# Patient Record
Sex: FEMALE | Race: WHITE | Employment: OTHER | URBAN - METROPOLITAN AREA
[De-identification: names, ages, dates, MRNs, and addresses within clinical notes are randomized per-mention and may not be internally consistent; named-entity substitution may affect disease eponyms.]

---

## 2017-01-01 ENCOUNTER — ANESTHESIA EVENT (OUTPATIENT)
Dept: SURGERY | Facility: HOSPITAL | Age: 37
DRG: 432 | End: 2017-01-01
Payer: MEDICAID

## 2017-01-01 ENCOUNTER — ANESTHESIA EVENT (OUTPATIENT)
Dept: ENDOSCOPY | Facility: HOSPITAL | Age: 37
DRG: 432 | End: 2017-01-01
Payer: MEDICAID

## 2017-01-01 ENCOUNTER — HOSPITAL ENCOUNTER (EMERGENCY)
Facility: HOSPITAL | Age: 37
Discharge: HOME | End: 2017-01-01
Attending: EMERGENCY MEDICINE
Payer: MEDICAID

## 2017-01-01 ENCOUNTER — APPOINTMENT (OUTPATIENT)
Dept: ULTRASOUND IMAGING | Facility: HOSPITAL | Age: 37
DRG: 432 | End: 2017-01-01
Attending: INTERNAL MEDICINE
Payer: MEDICAID

## 2017-01-01 ENCOUNTER — ANESTHESIA (OUTPATIENT)
Dept: ENDOSCOPY | Facility: HOSPITAL | Age: 37
End: 2017-01-01

## 2017-01-01 ENCOUNTER — APPOINTMENT (OUTPATIENT)
Dept: ULTRASOUND IMAGING | Facility: HOSPITAL | Age: 37
DRG: 178 | End: 2017-01-01
Attending: EMERGENCY MEDICINE
Payer: MEDICAID

## 2017-01-01 ENCOUNTER — ANESTHESIA (OUTPATIENT)
Dept: ENDOSCOPY | Facility: HOSPITAL | Age: 37
DRG: 441 | End: 2017-01-01
Payer: MEDICAID

## 2017-01-01 ENCOUNTER — SURGERY (OUTPATIENT)
Age: 37
End: 2017-01-01

## 2017-01-01 ENCOUNTER — ANESTHESIA EVENT (OUTPATIENT)
Dept: ENDOSCOPY | Facility: HOSPITAL | Age: 37
End: 2017-01-01
Payer: MEDICAID

## 2017-01-01 ENCOUNTER — HOSPITAL ENCOUNTER (OUTPATIENT)
Facility: HOSPITAL | Age: 37
Setting detail: HOSPITAL OUTPATIENT SURGERY
Discharge: HOME | End: 2017-01-01
Attending: INTERNAL MEDICINE | Admitting: INTERNAL MEDICINE
Payer: MEDICAID

## 2017-01-01 ENCOUNTER — ANESTHESIA (OUTPATIENT)
Dept: SURGERY | Facility: HOSPITAL | Age: 37
DRG: 432 | End: 2017-01-01
Payer: MEDICAID

## 2017-01-01 ENCOUNTER — HOSPITAL ENCOUNTER (INPATIENT)
Facility: HOSPITAL | Age: 37
LOS: 2 days | Discharge: HOME | DRG: 178 | End: 2017-01-01
Attending: EMERGENCY MEDICINE | Admitting: INTERNAL MEDICINE
Payer: MEDICAID

## 2017-01-01 ENCOUNTER — ANESTHESIA EVENT (OUTPATIENT)
Dept: ENDOSCOPY | Facility: HOSPITAL | Age: 37
End: 2017-01-01

## 2017-01-01 ENCOUNTER — APPOINTMENT (OUTPATIENT)
Dept: ULTRASOUND IMAGING | Facility: HOSPITAL | Age: 37
DRG: 432 | End: 2017-01-01
Attending: EMERGENCY MEDICINE
Payer: MEDICAID

## 2017-01-01 ENCOUNTER — ANESTHESIA EVENT (OUTPATIENT)
Dept: ENDOSCOPY | Facility: HOSPITAL | Age: 37
DRG: 441 | End: 2017-01-01
Payer: MEDICAID

## 2017-01-01 ENCOUNTER — APPOINTMENT (OUTPATIENT)
Dept: GENERAL RADIOLOGY | Facility: HOSPITAL | Age: 37
DRG: 432 | End: 2017-01-01
Attending: EMERGENCY MEDICINE
Payer: MEDICAID

## 2017-01-01 ENCOUNTER — ANESTHESIA (OUTPATIENT)
Dept: ENDOSCOPY | Facility: HOSPITAL | Age: 37
End: 2017-01-01
Payer: MEDICAID

## 2017-01-01 ENCOUNTER — HOSPITAL ENCOUNTER (INPATIENT)
Facility: HOSPITAL | Age: 37
LOS: 2 days | Discharge: HOME | DRG: 432 | End: 2017-01-01
Attending: EMERGENCY MEDICINE | Admitting: HOSPITALIST
Payer: MEDICAID

## 2017-01-01 ENCOUNTER — APPOINTMENT (OUTPATIENT)
Dept: GENERAL RADIOLOGY | Facility: HOSPITAL | Age: 37
DRG: 178 | End: 2017-01-01
Attending: EMERGENCY MEDICINE
Payer: MEDICAID

## 2017-01-01 ENCOUNTER — APPOINTMENT (OUTPATIENT)
Dept: GENERAL RADIOLOGY | Facility: HOSPITAL | Age: 37
DRG: 432 | End: 2017-01-01
Attending: NURSE PRACTITIONER
Payer: MEDICAID

## 2017-01-01 ENCOUNTER — HOSPITAL ENCOUNTER (INPATIENT)
Facility: HOSPITAL | Age: 37
LOS: 2 days | Discharge: HOME | DRG: 441 | End: 2017-01-01
Attending: EMERGENCY MEDICINE | Admitting: HOSPITALIST
Payer: MEDICAID

## 2017-01-01 ENCOUNTER — LAB ENCOUNTER (OUTPATIENT)
Dept: LAB | Facility: HOSPITAL | Age: 37
End: 2017-01-01
Attending: INTERNAL MEDICINE
Payer: MEDICAID

## 2017-01-01 ENCOUNTER — APPOINTMENT (OUTPATIENT)
Dept: LAB | Facility: HOSPITAL | Age: 37
End: 2017-01-01
Payer: MEDICAID

## 2017-01-01 ENCOUNTER — ANESTHESIA (OUTPATIENT)
Dept: ENDOSCOPY | Facility: HOSPITAL | Age: 37
DRG: 432 | End: 2017-01-01
Payer: MEDICAID

## 2017-01-01 ENCOUNTER — HOSPITAL ENCOUNTER (INPATIENT)
Facility: HOSPITAL | Age: 37
LOS: 1 days | Discharge: ANOTHER HOSPITAL | DRG: 432 | End: 2017-01-01
Attending: EMERGENCY MEDICINE | Admitting: INTERNAL MEDICINE
Payer: MEDICAID

## 2017-01-01 ENCOUNTER — APPOINTMENT (OUTPATIENT)
Dept: CT IMAGING | Facility: HOSPITAL | Age: 37
DRG: 432 | End: 2017-01-01
Attending: INTERNAL MEDICINE
Payer: MEDICAID

## 2017-01-01 ENCOUNTER — APPOINTMENT (OUTPATIENT)
Dept: CV DIAGNOSTICS | Facility: HOSPITAL | Age: 37
DRG: 432 | End: 2017-01-01
Attending: INTERNAL MEDICINE
Payer: MEDICAID

## 2017-01-01 VITALS
OXYGEN SATURATION: 100 % | RESPIRATION RATE: 12 BRPM | BODY MASS INDEX: 27.12 KG/M2 | HEART RATE: 75 BPM | SYSTOLIC BLOOD PRESSURE: 103 MMHG | DIASTOLIC BLOOD PRESSURE: 64 MMHG | TEMPERATURE: 98 F | HEIGHT: 67 IN | WEIGHT: 172.81 LBS

## 2017-01-01 VITALS
RESPIRATION RATE: 17 BRPM | SYSTOLIC BLOOD PRESSURE: 122 MMHG | HEIGHT: 67 IN | HEART RATE: 70 BPM | DIASTOLIC BLOOD PRESSURE: 68 MMHG | BODY MASS INDEX: 28.09 KG/M2 | TEMPERATURE: 98 F | OXYGEN SATURATION: 98 % | WEIGHT: 179 LBS

## 2017-01-01 VITALS
BODY MASS INDEX: 28.25 KG/M2 | TEMPERATURE: 99 F | HEART RATE: 84 BPM | OXYGEN SATURATION: 98 % | RESPIRATION RATE: 16 BRPM | HEIGHT: 67 IN | WEIGHT: 180 LBS | DIASTOLIC BLOOD PRESSURE: 84 MMHG | SYSTOLIC BLOOD PRESSURE: 134 MMHG

## 2017-01-01 VITALS
WEIGHT: 185.63 LBS | TEMPERATURE: 99 F | OXYGEN SATURATION: 94 % | SYSTOLIC BLOOD PRESSURE: 118 MMHG | DIASTOLIC BLOOD PRESSURE: 83 MMHG | BODY MASS INDEX: 29.13 KG/M2 | HEART RATE: 75 BPM | RESPIRATION RATE: 20 BRPM | HEIGHT: 67 IN

## 2017-01-01 VITALS
HEART RATE: 84 BPM | SYSTOLIC BLOOD PRESSURE: 102 MMHG | WEIGHT: 178 LBS | OXYGEN SATURATION: 98 % | BODY MASS INDEX: 27.94 KG/M2 | RESPIRATION RATE: 14 BRPM | TEMPERATURE: 98 F | DIASTOLIC BLOOD PRESSURE: 44 MMHG | HEIGHT: 67 IN

## 2017-01-01 VITALS
RESPIRATION RATE: 15 BRPM | HEIGHT: 67 IN | TEMPERATURE: 99 F | WEIGHT: 191.56 LBS | BODY MASS INDEX: 30.07 KG/M2 | HEART RATE: 84 BPM | DIASTOLIC BLOOD PRESSURE: 61 MMHG | SYSTOLIC BLOOD PRESSURE: 111 MMHG | OXYGEN SATURATION: 96 %

## 2017-01-01 VITALS
DIASTOLIC BLOOD PRESSURE: 77 MMHG | HEIGHT: 67 IN | SYSTOLIC BLOOD PRESSURE: 104 MMHG | TEMPERATURE: 98 F | HEART RATE: 84 BPM | RESPIRATION RATE: 16 BRPM | OXYGEN SATURATION: 98 % | WEIGHT: 180 LBS | BODY MASS INDEX: 28.25 KG/M2

## 2017-01-01 VITALS
OXYGEN SATURATION: 97 % | BODY MASS INDEX: 30.1 KG/M2 | DIASTOLIC BLOOD PRESSURE: 69 MMHG | HEART RATE: 85 BPM | SYSTOLIC BLOOD PRESSURE: 104 MMHG | RESPIRATION RATE: 18 BRPM | WEIGHT: 191.81 LBS | TEMPERATURE: 98 F | HEIGHT: 67 IN

## 2017-01-01 VITALS
RESPIRATION RATE: 16 BRPM | BODY MASS INDEX: 27.94 KG/M2 | HEART RATE: 71 BPM | OXYGEN SATURATION: 96 % | WEIGHT: 178 LBS | HEIGHT: 67 IN | DIASTOLIC BLOOD PRESSURE: 79 MMHG | TEMPERATURE: 99 F | SYSTOLIC BLOOD PRESSURE: 98 MMHG

## 2017-01-01 DIAGNOSIS — K92.2 GASTROINTESTINAL HEMORRHAGE, UNSPECIFIED GASTROINTESTINAL HEMORRHAGE TYPE: Primary | ICD-10-CM

## 2017-01-01 DIAGNOSIS — K70.31 ALCOHOLIC CIRRHOSIS OF LIVER WITH ASCITES (HCC): ICD-10-CM

## 2017-01-01 DIAGNOSIS — R06.00 DYSPNEA, UNSPECIFIED TYPE: Primary | ICD-10-CM

## 2017-01-01 DIAGNOSIS — K70.30 ALCOHOLIC CIRRHOSIS OF LIVER WITHOUT ASCITES (HCC): ICD-10-CM

## 2017-01-01 DIAGNOSIS — R17 JAUNDICE: ICD-10-CM

## 2017-01-01 DIAGNOSIS — I85.01 BLEEDING ESOPHAGEAL VARICES, UNSPECIFIED ESOPHAGEAL VARICES TYPE (HCC): Primary | ICD-10-CM

## 2017-01-01 DIAGNOSIS — Z01.818 PREOP TESTING: Primary | ICD-10-CM

## 2017-01-01 DIAGNOSIS — K72.00 ACUTE LIVER FAILURE WITHOUT HEPATIC COMA: Primary | ICD-10-CM

## 2017-01-01 DIAGNOSIS — K92.2 GASTROINTESTINAL HEMORRHAGE, UNSPECIFIED GASTROINTESTINAL HEMORRHAGE TYPE: ICD-10-CM

## 2017-01-01 DIAGNOSIS — K70.31 ASCITES DUE TO ALCOHOLIC CIRRHOSIS (HCC): ICD-10-CM
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
## 2017-01-01 DIAGNOSIS — D64.9 ANEMIA, UNSPECIFIED TYPE: ICD-10-CM

## 2017-01-01 DIAGNOSIS — K72.00 ACUTE LIVER FAILURE WITHOUT HEPATIC COMA: ICD-10-CM

## 2017-01-01 DIAGNOSIS — L03.115 CELLULITIS OF RIGHT LOWER EXTREMITY: Primary | ICD-10-CM

## 2017-01-01 DIAGNOSIS — Z01.818 PREOP TESTING: ICD-10-CM

## 2017-01-01 DIAGNOSIS — J18.9 NOSOCOMIAL PNEUMONIA: ICD-10-CM

## 2017-01-01 LAB
ALBUMIN SERPL-MCNC: 1.4 G/DL (ref 3.5–4.8)
ALBUMIN SERPL-MCNC: 1.5 G/DL (ref 3.5–4.8)
ALBUMIN SERPL-MCNC: 1.8 G/DL (ref 3.5–4.8)
ALLENS TEST: POSITIVE
ALP LIVER SERPL-CCNC: 122 U/L (ref 37–98)
ALP LIVER SERPL-CCNC: 135 U/L (ref 37–98)
ALP LIVER SERPL-CCNC: 280 U/L (ref 37–98)
ALT SERPL-CCNC: 25 U/L (ref 14–54)
ALT SERPL-CCNC: 31 U/L (ref 14–54)
ALT SERPL-CCNC: 33 U/L (ref 14–54)
ANTIBODY SCREEN: NEGATIVE
ANTIBODY SCREEN: NEGATIVE
APTT PPP: 48.6 SECONDS (ref 25–34)
APTT PPP: 50.1 SECONDS (ref 25–34)
APTT PPP: 55.7 SECONDS (ref 25–34)
ARTERIAL BLD GAS O2 SATURATION: 96 % (ref 92–100)
ARTERIAL BLOOD GAS BASE EXCESS: -0.8
ARTERIAL BLOOD GAS HCO3: 23.7 MEQ/L (ref 22–26)
ARTERIAL BLOOD GAS PCO2: 39 MM HG (ref 35–45)
ARTERIAL BLOOD GAS PH: 7.41 (ref 7.35–7.45)
ARTERIAL BLOOD GAS PO2: 137 MM HG (ref 80–105)
AST SERPL-CCNC: 112 U/L (ref 15–41)
AST SERPL-CCNC: 132 U/L (ref 15–41)
AST SERPL-CCNC: 74 U/L (ref 15–41)
ATRIAL RATE: 89 BPM
BASOPHILS # BLD AUTO: 0.03 X10(3) UL (ref 0–0.1)
BASOPHILS # BLD AUTO: 0.04 X10(3) UL (ref 0–0.1)
BASOPHILS # BLD AUTO: 0.05 X10(3) UL (ref 0–0.1)
BASOPHILS NFR BLD AUTO: 0.4 %
BASOPHILS NFR BLD AUTO: 0.8 %
BASOPHILS NFR BLD AUTO: 0.8 %
BILIRUB SERPL-MCNC: 5 MG/DL (ref 0.1–2)
BILIRUB SERPL-MCNC: 7.7 MG/DL (ref 0.1–2)
BILIRUB SERPL-MCNC: 8.7 MG/DL (ref 0.1–2)
BLOOD TYPE BARCODE: 600
BLOOD TYPE BARCODE: 6200
BLOOD TYPE BARCODE: 6200
BLOOD TYPE BARCODE: 9500
BLOOD TYPE BARCODE: 9500
BUN BLD-MCNC: 2 MG/DL (ref 8–20)
BUN BLD-MCNC: 8 MG/DL (ref 8–20)
BUN BLD-MCNC: 9 MG/DL (ref 8–20)
CALCIUM BLD-MCNC: 6.7 MG/DL (ref 8.3–10.3)
CALCIUM BLD-MCNC: 7.7 MG/DL (ref 8.3–10.3)
CALCIUM BLD-MCNC: 7.9 MG/DL (ref 8.3–10.3)
CALCULATED O2 SATURATION: 99 % (ref 92–100)
CARBOXYHEMOGLOBIN: 2 % SAT (ref 0–3)
CHLORIDE: 101 MMOL/L (ref 101–111)
CHLORIDE: 104 MMOL/L (ref 101–111)
CHLORIDE: 104 MMOL/L (ref 101–111)
CO2: 25 MMOL/L (ref 22–32)
CO2: 28 MMOL/L (ref 22–32)
CO2: 28 MMOL/L (ref 22–32)
CREAT BLD-MCNC: 0.75 MG/DL (ref 0.55–1.02)
CREAT BLD-MCNC: 0.81 MG/DL (ref 0.55–1.02)
CREAT BLD-MCNC: 1.04 MG/DL (ref 0.55–1.02)
EOSINOPHIL # BLD AUTO: 0.07 X10(3) UL (ref 0–0.3)
EOSINOPHIL # BLD AUTO: 0.09 X10(3) UL (ref 0–0.3)
EOSINOPHIL # BLD AUTO: 0.15 X10(3) UL (ref 0–0.3)
EOSINOPHIL NFR BLD AUTO: 1.3 %
EOSINOPHIL NFR BLD AUTO: 1.5 %
EOSINOPHIL NFR BLD AUTO: 1.9 %
ERYTHROCYTE [DISTWIDTH] IN BLOOD BY AUTOMATED COUNT: 14.1 % (ref 11.5–16)
ERYTHROCYTE [DISTWIDTH] IN BLOOD BY AUTOMATED COUNT: 14.2 % (ref 11.5–16)
ERYTHROCYTE [DISTWIDTH] IN BLOOD BY AUTOMATED COUNT: 21.6 % (ref 11.5–16)
FIO2: 40 %
GLUCOSE BLD-MCNC: 80 MG/DL (ref 70–99)
GLUCOSE BLD-MCNC: 91 MG/DL (ref 70–99)
GLUCOSE BLD-MCNC: 93 MG/DL (ref 70–99)
HCG QUANTITATIVE: <1 MIU/ML (ref ?–1)
HCT VFR BLD AUTO: 22.5 % (ref 34–50)
HCT VFR BLD AUTO: 27.5 % (ref 34–50)
HCT VFR BLD AUTO: 30.9 % (ref 34–50)
HGB BLD-MCNC: 7.2 G/DL (ref 12–16)
HGB BLD-MCNC: 8.9 G/DL (ref 12–16)
HGB BLD-MCNC: 9.9 G/DL (ref 12–16)
IMMATURE GRANULOCYTE COUNT: 0.02 X10(3) UL (ref 0–1)
IMMATURE GRANULOCYTE COUNT: 0.02 X10(3) UL (ref 0–1)
IMMATURE GRANULOCYTE COUNT: 0.03 X10(3) UL (ref 0–1)
IMMATURE GRANULOCYTE RATIO %: 0.3 %
IMMATURE GRANULOCYTE RATIO %: 0.4 %
IMMATURE GRANULOCYTE RATIO %: 0.4 %
INR BLD: 1.84 (ref 0.89–1.11)
INR BLD: 2 (ref 0.89–1.11)
INR BLD: 2.24 (ref 0.89–1.11)
LYMPHOCYTES # BLD AUTO: 1.13 X10(3) UL (ref 0.9–4)
LYMPHOCYTES # BLD AUTO: 1.38 X10(3) UL (ref 0.9–4)
LYMPHOCYTES # BLD AUTO: 2.21 X10(3) UL (ref 0.9–4)
LYMPHOCYTES NFR BLD AUTO: 21.8 %
LYMPHOCYTES NFR BLD AUTO: 22.8 %
LYMPHOCYTES NFR BLD AUTO: 27.5 %
M PROTEIN MFR SERPL ELPH: 7.1 G/DL (ref 6.1–8.3)
M PROTEIN MFR SERPL ELPH: 8.1 G/DL (ref 6.1–8.3)
M PROTEIN MFR SERPL ELPH: 8.4 G/DL (ref 6.1–8.3)
MCH RBC QN AUTO: 29.4 PG (ref 27–33.2)
MCH RBC QN AUTO: 32.7 PG (ref 27–33.2)
MCH RBC QN AUTO: 33.2 PG (ref 27–33.2)
MCHC RBC AUTO-ENTMCNC: 32 G/DL (ref 31–37)
MCHC RBC AUTO-ENTMCNC: 32 G/DL (ref 31–37)
MCHC RBC AUTO-ENTMCNC: 32.4 G/DL (ref 31–37)
MCV RBC AUTO: 102.3 FL (ref 81–100)
MCV RBC AUTO: 102.6 FL (ref 81–100)
MCV RBC AUTO: 91.7 FL (ref 81–100)
METHEMOGLOBIN: 0.5 % SAT (ref 0.4–1.5)
MONOCYTES # BLD AUTO: 0.35 X10(3) UL (ref 0.1–0.6)
MONOCYTES # BLD AUTO: 0.41 X10(3) UL (ref 0.1–0.6)
MONOCYTES # BLD AUTO: 0.49 X10(3) UL (ref 0.1–0.6)
MONOCYTES NFR BLD AUTO: 5.1 %
MONOCYTES NFR BLD AUTO: 5.8 %
MONOCYTES NFR BLD AUTO: 9.4 %
NEUTROPHIL ABS PRELIM: 3.44 X10 (3) UL (ref 1.3–6.7)
NEUTROPHIL ABS PRELIM: 4.16 X10 (3) UL (ref 1.3–6.7)
NEUTROPHIL ABS PRELIM: 5.2 X10 (3) UL (ref 1.3–6.7)
NEUTROPHILS # BLD AUTO: 3.44 X10(3) UL (ref 1.3–6.7)
NEUTROPHILS # BLD AUTO: 4.16 X10(3) UL (ref 1.3–6.7)
NEUTROPHILS # BLD AUTO: 5.2 X10(3) UL (ref 1.3–6.7)
NEUTROPHILS NFR BLD AUTO: 64.7 %
NEUTROPHILS NFR BLD AUTO: 66.3 %
NEUTROPHILS NFR BLD AUTO: 68.8 %
P AXIS: 37 DEGREES
P-R INTERVAL: 140 MS
PATIENT TEMPERATURE: 98.8 F
PEEP: 5 CM H2O
PLATELET # BLD AUTO: 104 10(3)UL (ref 150–450)
PLATELET # BLD AUTO: 129 10(3)UL (ref 150–450)
PLATELET # BLD AUTO: 73 10(3)UL (ref 150–450)
PLATELET MORPHOLOGY: NORMAL
POCT LOT NUMBER: NORMAL
POCT LOT NUMBER: NORMAL
POCT URINE PREGNANCY: NEGATIVE
POCT URINE PREGNANCY: NEGATIVE
POTASSIUM SERPL-SCNC: 3.3 MMOL/L (ref 3.6–5.1)
POTASSIUM SERPL-SCNC: 3.6 MMOL/L (ref 3.6–5.1)
POTASSIUM SERPL-SCNC: 3.7 MMOL/L (ref 3.6–5.1)
PSA SERPL DL<=0.01 NG/ML-MCNC: 21.5 SECONDS (ref 12–14.3)
PSA SERPL DL<=0.01 NG/ML-MCNC: 23 SECONDS (ref 12–14.3)
PSA SERPL DL<=0.01 NG/ML-MCNC: 25.2 SECONDS (ref 12–14.3)
Q-T INTERVAL: 438 MS
QRS DURATION: 74 MS
QTC CALCULATION (BEZET): 532 MS
R AXIS: 30 DEGREES
RBC # BLD AUTO: 2.2 X10(6)UL (ref 3.8–5.1)
RBC # BLD AUTO: 2.68 X10(6)UL (ref 3.8–5.1)
RBC # BLD AUTO: 3.37 X10(6)UL (ref 3.8–5.1)
RED CELL DISTRIBUTION WIDTH-SD: 52.4 FL (ref 35.1–46.3)
RED CELL DISTRIBUTION WIDTH-SD: 53.2 FL (ref 35.1–46.3)
RED CELL DISTRIBUTION WIDTH-SD: 71.5 FL (ref 35.1–46.3)
RH BLOOD TYPE: NEGATIVE
RH BLOOD TYPE: NEGATIVE
SODIUM SERPL-SCNC: 138 MMOL/L (ref 136–144)
SODIUM SERPL-SCNC: 138 MMOL/L (ref 136–144)
SODIUM SERPL-SCNC: 139 MMOL/L (ref 136–144)
T AXIS: 49 DEGREES
TIDAL VOLUME: 600 ML
TOTAL HEMOGLOBIN: 7 G/DL (ref 11.7–15.3)
VENT RATE: 12 /MIN
VENTRICULAR RATE: 89 BPM
WBC # BLD AUTO: 5.2 X10(3) UL (ref 4–13)
WBC # BLD AUTO: 6.1 X10(3) UL (ref 4–13)
WBC # BLD AUTO: 8 X10(3) UL (ref 4–13)

## 2017-01-01 PROCEDURE — 96365 THER/PROPH/DIAG IV INF INIT: CPT

## 2017-01-01 PROCEDURE — 93975 VASCULAR STUDY: CPT | Performed by: EMERGENCY MEDICINE

## 2017-01-01 PROCEDURE — 96360 HYDRATION IV INFUSION INIT: CPT

## 2017-01-01 PROCEDURE — 36415 COLL VENOUS BLD VENIPUNCTURE: CPT

## 2017-01-01 PROCEDURE — 86920 COMPATIBILITY TEST SPIN: CPT

## 2017-01-01 PROCEDURE — 86900 BLOOD TYPING SEROLOGIC ABO: CPT | Performed by: EMERGENCY MEDICINE

## 2017-01-01 PROCEDURE — 99223 1ST HOSP IP/OBS HIGH 75: CPT | Performed by: HOSPITALIST

## 2017-01-01 PROCEDURE — 87040 BLOOD CULTURE FOR BACTERIA: CPT | Performed by: EMERGENCY MEDICINE

## 2017-01-01 PROCEDURE — 99283 EMERGENCY DEPT VISIT LOW MDM: CPT

## 2017-01-01 PROCEDURE — 85730 THROMBOPLASTIN TIME PARTIAL: CPT

## 2017-01-01 PROCEDURE — 80053 COMPREHEN METABOLIC PANEL: CPT | Performed by: EMERGENCY MEDICINE

## 2017-01-01 PROCEDURE — 93306 TTE W/DOPPLER COMPLETE: CPT | Performed by: INTERNAL MEDICINE

## 2017-01-01 PROCEDURE — 99291 CRITICAL CARE FIRST HOUR: CPT

## 2017-01-01 PROCEDURE — 06L34CZ OCCLUSION OF ESOPHAGEAL VEIN WITH EXTRALUMINAL DEVICE, PERCUTANEOUS ENDOSCOPIC APPROACH: ICD-10-PCS | Performed by: INTERNAL MEDICINE

## 2017-01-01 PROCEDURE — 81025 URINE PREGNANCY TEST: CPT | Performed by: INTERNAL MEDICINE

## 2017-01-01 PROCEDURE — 76700 US EXAM ABDOM COMPLETE: CPT | Performed by: EMERGENCY MEDICINE

## 2017-01-01 PROCEDURE — 96368 THER/DIAG CONCURRENT INF: CPT

## 2017-01-01 PROCEDURE — 99232 SBSQ HOSP IP/OBS MODERATE 35: CPT | Performed by: HOSPITALIST

## 2017-01-01 PROCEDURE — 06L34ZZ OCCLUSION OF ESOPHAGEAL VEIN, PERCUTANEOUS ENDOSCOPIC APPROACH: ICD-10-PCS | Performed by: INTERNAL MEDICINE

## 2017-01-01 PROCEDURE — 82375 ASSAY CARBOXYHB QUANT: CPT | Performed by: NURSE PRACTITIONER

## 2017-01-01 PROCEDURE — 99239 HOSP IP/OBS DSCHRG MGMT >30: CPT | Performed by: INTERNAL MEDICINE

## 2017-01-01 PROCEDURE — 80053 COMPREHEN METABOLIC PANEL: CPT | Performed by: INTERNAL MEDICINE

## 2017-01-01 PROCEDURE — 85730 THROMBOPLASTIN TIME PARTIAL: CPT | Performed by: EMERGENCY MEDICINE

## 2017-01-01 PROCEDURE — 93005 ELECTROCARDIOGRAM TRACING: CPT

## 2017-01-01 PROCEDURE — 83050 HGB METHEMOGLOBIN QUAN: CPT | Performed by: NURSE PRACTITIONER

## 2017-01-01 PROCEDURE — 99285 EMERGENCY DEPT VISIT HI MDM: CPT

## 2017-01-01 PROCEDURE — 99292 CRITICAL CARE ADDL 30 MIN: CPT

## 2017-01-01 PROCEDURE — 86927 PLASMA FRESH FROZEN: CPT

## 2017-01-01 PROCEDURE — 36430 TRANSFUSION BLD/BLD COMPNT: CPT

## 2017-01-01 PROCEDURE — 86901 BLOOD TYPING SEROLOGIC RH(D): CPT | Performed by: EMERGENCY MEDICINE

## 2017-01-01 PROCEDURE — 85025 COMPLETE CBC W/AUTO DIFF WBC: CPT | Performed by: INTERNAL MEDICINE

## 2017-01-01 PROCEDURE — 85610 PROTHROMBIN TIME: CPT | Performed by: EMERGENCY MEDICINE

## 2017-01-01 PROCEDURE — 3E0G8GC INTRODUCTION OF OTHER THERAPEUTIC SUBSTANCE INTO UPPER GI, VIA NATURAL OR ARTIFICIAL OPENING ENDOSCOPIC: ICD-10-PCS | Performed by: INTERNAL MEDICINE

## 2017-01-01 PROCEDURE — 86850 RBC ANTIBODY SCREEN: CPT | Performed by: EMERGENCY MEDICINE

## 2017-01-01 PROCEDURE — 81003 URINALYSIS AUTO W/O SCOPE: CPT | Performed by: EMERGENCY MEDICINE

## 2017-01-01 PROCEDURE — 99284 EMERGENCY DEPT VISIT MOD MDM: CPT

## 2017-01-01 PROCEDURE — 81001 URINALYSIS AUTO W/SCOPE: CPT | Performed by: EMERGENCY MEDICINE

## 2017-01-01 PROCEDURE — 76700 US EXAM ABDOM COMPLETE: CPT

## 2017-01-01 PROCEDURE — 99239 HOSP IP/OBS DSCHRG MGMT >30: CPT | Performed by: HOSPITALIST

## 2017-01-01 PROCEDURE — 80053 COMPREHEN METABOLIC PANEL: CPT

## 2017-01-01 PROCEDURE — 94002 VENT MGMT INPAT INIT DAY: CPT

## 2017-01-01 PROCEDURE — 36600 WITHDRAWAL OF ARTERIAL BLOOD: CPT | Performed by: NURSE PRACTITIONER

## 2017-01-01 PROCEDURE — 87081 CULTURE SCREEN ONLY: CPT | Performed by: HOSPITALIST

## 2017-01-01 PROCEDURE — 0DJ08ZZ INSPECTION OF UPPER INTESTINAL TRACT, VIA NATURAL OR ARTIFICIAL OPENING ENDOSCOPIC: ICD-10-PCS | Performed by: INTERNAL MEDICINE

## 2017-01-01 PROCEDURE — 85025 COMPLETE CBC W/AUTO DIFF WBC: CPT | Performed by: EMERGENCY MEDICINE

## 2017-01-01 PROCEDURE — 84145 PROCALCITONIN (PCT): CPT | Performed by: EMERGENCY MEDICINE

## 2017-01-01 PROCEDURE — 30233K1 TRANSFUSION OF NONAUTOLOGOUS FROZEN PLASMA INTO PERIPHERAL VEIN, PERCUTANEOUS APPROACH: ICD-10-PCS | Performed by: EMERGENCY MEDICINE

## 2017-01-01 PROCEDURE — 49083 ABD PARACENTESIS W/IMAGING: CPT | Performed by: INTERNAL MEDICINE

## 2017-01-01 PROCEDURE — 71010 XR CHEST AP PORTABLE  (CPT=71010): CPT | Performed by: EMERGENCY MEDICINE

## 2017-01-01 PROCEDURE — 82803 BLOOD GASES ANY COMBINATION: CPT | Performed by: NURSE PRACTITIONER

## 2017-01-01 PROCEDURE — 74178 CT ABD&PLV WO CNTR FLWD CNTR: CPT

## 2017-01-01 PROCEDURE — 83690 ASSAY OF LIPASE: CPT | Performed by: EMERGENCY MEDICINE

## 2017-01-01 PROCEDURE — 96366 THER/PROPH/DIAG IV INF ADDON: CPT

## 2017-01-01 PROCEDURE — 85025 COMPLETE CBC W/AUTO DIFF WBC: CPT

## 2017-01-01 PROCEDURE — C9113 INJ PANTOPRAZOLE SODIUM, VIA: HCPCS | Performed by: EMERGENCY MEDICINE

## 2017-01-01 PROCEDURE — 93010 ELECTROCARDIOGRAM REPORT: CPT

## 2017-01-01 PROCEDURE — 85018 HEMOGLOBIN: CPT | Performed by: NURSE PRACTITIONER

## 2017-01-01 PROCEDURE — 84702 CHORIONIC GONADOTROPIN TEST: CPT | Performed by: INTERNAL MEDICINE

## 2017-01-01 PROCEDURE — 85610 PROTHROMBIN TIME: CPT

## 2017-01-01 PROCEDURE — 71010 XR CHEST AP PORTABLE  (CPT=71010): CPT | Performed by: NURSE PRACTITIONER

## 2017-01-01 PROCEDURE — 0W3P8ZZ CONTROL BLEEDING IN GASTROINTESTINAL TRACT, VIA NATURAL OR ARTIFICIAL OPENING ENDOSCOPIC: ICD-10-PCS | Performed by: INTERNAL MEDICINE

## 2017-01-01 PROCEDURE — 99232 SBSQ HOSP IP/OBS MODERATE 35: CPT | Performed by: INTERNAL MEDICINE

## 2017-01-01 PROCEDURE — 81025 URINE PREGNANCY TEST: CPT

## 2017-01-01 PROCEDURE — 93975 VASCULAR STUDY: CPT

## 2017-01-01 PROCEDURE — 84132 ASSAY OF SERUM POTASSIUM: CPT | Performed by: INTERNAL MEDICINE

## 2017-01-01 PROCEDURE — 30233N1 TRANSFUSION OF NONAUTOLOGOUS RED BLOOD CELLS INTO PERIPHERAL VEIN, PERCUTANEOUS APPROACH: ICD-10-PCS | Performed by: EMERGENCY MEDICINE

## 2017-01-01 PROCEDURE — 96375 TX/PRO/DX INJ NEW DRUG ADDON: CPT

## 2017-01-01 PROCEDURE — 85610 PROTHROMBIN TIME: CPT | Performed by: INTERNAL MEDICINE

## 2017-01-01 PROCEDURE — 30233R1 TRANSFUSION OF NONAUTOLOGOUS PLATELETS INTO PERIPHERAL VEIN, PERCUTANEOUS APPROACH: ICD-10-PCS | Performed by: EMERGENCY MEDICINE

## 2017-01-01 RX ORDER — NALOXONE HYDROCHLORIDE 0.4 MG/ML
80 INJECTION, SOLUTION INTRAMUSCULAR; INTRAVENOUS; SUBCUTANEOUS AS NEEDED
Status: DISCONTINUED | OUTPATIENT
Start: 2017-01-01 | End: 2017-01-01 | Stop reason: HOSPADM

## 2017-01-01 RX ORDER — SIMETHICONE 80 MG
160 TABLET,CHEWABLE ORAL 4 TIMES DAILY PRN
Status: DISCONTINUED | OUTPATIENT
Start: 2017-01-01 | End: 2017-01-01

## 2017-01-01 RX ORDER — SODIUM CHLORIDE 9 MG/ML
INJECTION, SOLUTION INTRAVENOUS CONTINUOUS
Status: DISCONTINUED | OUTPATIENT
Start: 2017-01-01 | End: 2017-01-01

## 2017-01-01 RX ORDER — MIDAZOLAM HYDROCHLORIDE 1 MG/ML
1 INJECTION INTRAMUSCULAR; INTRAVENOUS EVERY 5 MIN PRN
Status: DISCONTINUED | OUTPATIENT
Start: 2017-01-01 | End: 2017-01-01

## 2017-01-01 RX ORDER — SODIUM CHLORIDE, SODIUM LACTATE, POTASSIUM CHLORIDE, CALCIUM CHLORIDE 600; 310; 30; 20 MG/100ML; MG/100ML; MG/100ML; MG/100ML
INJECTION, SOLUTION INTRAVENOUS CONTINUOUS
Status: DISCONTINUED | OUTPATIENT
Start: 2017-01-01 | End: 2017-01-01

## 2017-01-01 RX ORDER — SODIUM CHLORIDE 9 MG/ML
INJECTION, SOLUTION INTRAVENOUS ONCE
Status: DISCONTINUED | OUTPATIENT
Start: 2017-01-01 | End: 2017-01-01

## 2017-01-01 RX ORDER — ONDANSETRON 2 MG/ML
INJECTION INTRAMUSCULAR; INTRAVENOUS
Status: COMPLETED
Start: 2017-01-01 | End: 2017-01-01

## 2017-01-01 RX ORDER — LEVOFLOXACIN 750 MG/1
750 TABLET ORAL DAILY
Qty: 5 TABLET | Refills: 0 | Status: SHIPPED | OUTPATIENT
Start: 2017-01-01 | End: 2017-01-01

## 2017-01-01 RX ORDER — CHLORHEXIDINE GLUCONATE 0.12 MG/ML
15 RINSE ORAL
Status: DISCONTINUED | OUTPATIENT
Start: 2017-01-01 | End: 2017-01-01

## 2017-01-01 RX ORDER — MAGNESIUM OXIDE 400 MG (241.3 MG MAGNESIUM) TABLET
400 TABLET ONCE
Status: COMPLETED | OUTPATIENT
Start: 2017-01-01 | End: 2017-01-01

## 2017-01-01 RX ORDER — DIPHENHYDRAMINE HYDROCHLORIDE 50 MG/ML
12.5 INJECTION INTRAMUSCULAR; INTRAVENOUS ONCE
Status: COMPLETED | OUTPATIENT
Start: 2017-01-01 | End: 2017-01-01

## 2017-01-01 RX ORDER — DIPHENHYDRAMINE HYDROCHLORIDE 50 MG/ML
25 INJECTION INTRAMUSCULAR; INTRAVENOUS EVERY 6 HOURS PRN
Status: DISCONTINUED | OUTPATIENT
Start: 2017-01-01 | End: 2017-01-01

## 2017-01-01 RX ORDER — FUROSEMIDE 10 MG/ML
40 INJECTION INTRAMUSCULAR; INTRAVENOUS DAILY
Status: DISCONTINUED | OUTPATIENT
Start: 2017-01-01 | End: 2017-01-01

## 2017-01-01 RX ORDER — SODIUM CHLORIDE 9 MG/ML
INJECTION, SOLUTION INTRAVENOUS ONCE
Status: COMPLETED | OUTPATIENT
Start: 2017-01-01 | End: 2017-01-01

## 2017-01-01 RX ORDER — PROPRANOLOL HYDROCHLORIDE 20 MG/1
20 TABLET ORAL 3 TIMES DAILY
Status: CANCELLED | OUTPATIENT
Start: 2017-01-01

## 2017-01-01 RX ORDER — ONDANSETRON 2 MG/ML
4 INJECTION INTRAMUSCULAR; INTRAVENOUS EVERY 6 HOURS PRN
Status: DISCONTINUED | OUTPATIENT
Start: 2017-01-01 | End: 2017-01-01

## 2017-01-01 RX ORDER — FUROSEMIDE 40 MG/1
40 TABLET ORAL DAILY
Qty: 30 TABLET | Refills: 3 | Status: SHIPPED | OUTPATIENT
Start: 2017-01-01 | End: 2018-01-01

## 2017-01-01 RX ORDER — ONDANSETRON 2 MG/ML
4 INJECTION INTRAMUSCULAR; INTRAVENOUS EVERY 4 HOURS PRN
Status: DISCONTINUED | OUTPATIENT
Start: 2017-01-01 | End: 2017-01-01

## 2017-01-01 RX ORDER — LABETALOL HYDROCHLORIDE 5 MG/ML
5 INJECTION, SOLUTION INTRAVENOUS EVERY 5 MIN PRN
Status: DISCONTINUED | OUTPATIENT
Start: 2017-01-01 | End: 2017-01-01

## 2017-01-01 RX ORDER — PNV NO.95/FERROUS FUM/FOLIC AC 28MG-0.8MG
1 TABLET ORAL DAILY
COMMUNITY

## 2017-01-01 RX ORDER — METOCLOPRAMIDE HYDROCHLORIDE 5 MG/ML
10 INJECTION INTRAMUSCULAR; INTRAVENOUS AS NEEDED
Status: DISCONTINUED | OUTPATIENT
Start: 2017-01-01 | End: 2017-01-01

## 2017-01-01 RX ORDER — NALOXONE HYDROCHLORIDE 0.4 MG/ML
80 INJECTION, SOLUTION INTRAMUSCULAR; INTRAVENOUS; SUBCUTANEOUS AS NEEDED
Status: DISCONTINUED | OUTPATIENT
Start: 2017-01-01 | End: 2017-01-01

## 2017-01-01 RX ORDER — PANTOPRAZOLE SODIUM 40 MG/1
40 TABLET, DELAYED RELEASE ORAL
Qty: 30 TABLET | Refills: 3 | Status: SHIPPED | OUTPATIENT
Start: 2017-01-01 | End: 2017-01-01

## 2017-01-01 RX ORDER — SIMETHICONE 80 MG
80 TABLET,CHEWABLE ORAL 4 TIMES DAILY PRN
Status: DISCONTINUED | OUTPATIENT
Start: 2017-01-01 | End: 2017-01-01

## 2017-01-01 RX ORDER — DIPHENHYDRAMINE HCL 25 MG
25 CAPSULE ORAL EVERY 6 HOURS PRN
Status: DISCONTINUED | OUTPATIENT
Start: 2017-01-01 | End: 2017-01-01

## 2017-01-01 RX ORDER — ONDANSETRON 4 MG/1
4 TABLET, ORALLY DISINTEGRATING ORAL ONCE
Status: COMPLETED | OUTPATIENT
Start: 2017-01-01 | End: 2017-01-01

## 2017-01-01 RX ORDER — FOLIC ACID 1 MG/1
1 TABLET ORAL DAILY
COMMUNITY
End: 2017-01-01

## 2017-01-01 RX ORDER — LORAZEPAM 2 MG/ML
4 INJECTION INTRAMUSCULAR EVERY 10 MIN PRN
Status: DISCONTINUED | OUTPATIENT
Start: 2017-01-01 | End: 2017-01-01

## 2017-01-01 RX ORDER — MIDAZOLAM HYDROCHLORIDE 1 MG/ML
2 INJECTION INTRAMUSCULAR; INTRAVENOUS EVERY 5 MIN PRN
Status: DISCONTINUED | OUTPATIENT
Start: 2017-01-01 | End: 2017-01-01

## 2017-01-01 RX ORDER — POTASSIUM CHLORIDE 20 MEQ/1
40 TABLET, EXTENDED RELEASE ORAL ONCE
Status: DISCONTINUED | OUTPATIENT
Start: 2017-01-01 | End: 2017-01-01

## 2017-01-01 RX ORDER — ONDANSETRON 2 MG/ML
4 INJECTION INTRAMUSCULAR; INTRAVENOUS ONCE
Status: COMPLETED | OUTPATIENT
Start: 2017-01-01 | End: 2017-01-01

## 2017-01-01 RX ORDER — ESCITALOPRAM OXALATE 5 MG/1
5 TABLET ORAL DAILY
Status: CANCELLED | OUTPATIENT
Start: 2017-01-01

## 2017-01-01 RX ORDER — PROPRANOLOL HYDROCHLORIDE 10 MG/1
10 TABLET ORAL 3 TIMES DAILY
Status: DISCONTINUED | OUTPATIENT
Start: 2017-01-01 | End: 2017-01-01

## 2017-01-01 RX ORDER — NALOXONE HYDROCHLORIDE 0.4 MG/ML
80 INJECTION, SOLUTION INTRAMUSCULAR; INTRAVENOUS; SUBCUTANEOUS AS NEEDED
Status: CANCELLED | OUTPATIENT
Start: 2017-01-01 | End: 2017-01-01

## 2017-01-01 RX ORDER — PANTOPRAZOLE SODIUM 40 MG/1
40 TABLET, DELAYED RELEASE ORAL
Status: DISCONTINUED | OUTPATIENT
Start: 2017-01-01 | End: 2017-01-01

## 2017-01-01 RX ORDER — HYDROMORPHONE HYDROCHLORIDE 1 MG/ML
0.2 INJECTION, SOLUTION INTRAMUSCULAR; INTRAVENOUS; SUBCUTANEOUS EVERY 2 HOUR PRN
Status: DISCONTINUED | OUTPATIENT
Start: 2017-01-01 | End: 2017-01-01

## 2017-01-01 RX ORDER — POTASSIUM CHLORIDE 20 MEQ/1
40 TABLET, EXTENDED RELEASE ORAL ONCE
Status: COMPLETED | OUTPATIENT
Start: 2017-01-01 | End: 2017-01-01

## 2017-01-01 RX ORDER — SODIUM CHLORIDE, SODIUM LACTATE, POTASSIUM CHLORIDE, CALCIUM CHLORIDE 600; 310; 30; 20 MG/100ML; MG/100ML; MG/100ML; MG/100ML
INJECTION, SOLUTION INTRAVENOUS CONTINUOUS
Status: CANCELLED | OUTPATIENT
Start: 2017-01-01

## 2017-01-01 RX ORDER — LORAZEPAM 2 MG/ML
3 INJECTION INTRAMUSCULAR
Status: DISCONTINUED | OUTPATIENT
Start: 2017-01-01 | End: 2017-01-01

## 2017-01-01 RX ORDER — ESCITALOPRAM OXALATE 5 MG/1
5 TABLET ORAL DAILY
Status: DISCONTINUED | OUTPATIENT
Start: 2017-01-01 | End: 2017-01-01

## 2017-01-01 RX ORDER — SPIRONOLACTONE 25 MG/1
25 TABLET ORAL DAILY
Status: DISCONTINUED | OUTPATIENT
Start: 2017-01-01 | End: 2017-01-01

## 2017-01-01 RX ORDER — PANTOPRAZOLE SODIUM 20 MG/1
20 TABLET, DELAYED RELEASE ORAL
Status: DISCONTINUED | OUTPATIENT
Start: 2017-01-01 | End: 2017-01-01

## 2017-01-01 RX ORDER — ONDANSETRON 2 MG/ML
INJECTION INTRAMUSCULAR; INTRAVENOUS
Status: DISCONTINUED
Start: 2017-01-01 | End: 2017-01-01

## 2017-01-01 RX ORDER — MAGNESIUM OXIDE 400 MG (241.3 MG MAGNESIUM) TABLET
800 TABLET DAILY
COMMUNITY
End: 2017-01-01

## 2017-01-01 RX ORDER — HYDROMORPHONE HYDROCHLORIDE 1 MG/ML
0.4 INJECTION, SOLUTION INTRAMUSCULAR; INTRAVENOUS; SUBCUTANEOUS EVERY 5 MIN PRN
Status: DISCONTINUED | OUTPATIENT
Start: 2017-01-01 | End: 2017-01-01

## 2017-01-01 RX ORDER — MULTIVITAMIN WITH IRON
500 TABLET ORAL 2 TIMES DAILY
COMMUNITY
End: 2018-01-01

## 2017-01-01 RX ORDER — DEXAMETHASONE SODIUM PHOSPHATE 4 MG/ML
4 VIAL (ML) INJECTION AS NEEDED
Status: DISCONTINUED | OUTPATIENT
Start: 2017-01-01 | End: 2017-01-01

## 2017-01-01 RX ORDER — ONDANSETRON 2 MG/ML
4 INJECTION INTRAMUSCULAR; INTRAVENOUS AS NEEDED
Status: DISCONTINUED | OUTPATIENT
Start: 2017-01-01 | End: 2017-01-01

## 2017-01-01 RX ORDER — SPIRONOLACTONE 25 MG/1
25 TABLET ORAL DAILY
Qty: 30 TABLET | Refills: 3 | Status: SHIPPED | OUTPATIENT
Start: 2017-01-01 | End: 2018-01-01

## 2017-01-01 RX ORDER — LORAZEPAM 2 MG/ML
2 INJECTION INTRAMUSCULAR
Status: DISCONTINUED | OUTPATIENT
Start: 2017-01-01 | End: 2017-01-01

## 2017-01-01 RX ORDER — ASCORBIC ACID 1000 MG
10 TABLET ORAL DAILY
COMMUNITY
End: 2017-01-01

## 2017-01-01 RX ORDER — HYDROMORPHONE HYDROCHLORIDE 1 MG/ML
0.4 INJECTION, SOLUTION INTRAMUSCULAR; INTRAVENOUS; SUBCUTANEOUS EVERY 2 HOUR PRN
Status: DISCONTINUED | OUTPATIENT
Start: 2017-01-01 | End: 2017-01-01

## 2017-01-01 RX ORDER — ACETAMINOPHEN 325 MG/1
650 TABLET ORAL EVERY 6 HOURS PRN
Status: DISCONTINUED | OUTPATIENT
Start: 2017-01-01 | End: 2017-01-01

## 2017-01-01 RX ORDER — LORAZEPAM 2 MG/ML
1 INJECTION INTRAMUSCULAR EVERY 30 MIN PRN
Status: DISCONTINUED | OUTPATIENT
Start: 2017-01-01 | End: 2017-01-01

## 2017-01-01 RX ORDER — DOXEPIN HYDROCHLORIDE 50 MG/1
1 CAPSULE ORAL DAILY
COMMUNITY
End: 2017-01-01

## 2017-01-01 RX ORDER — CEPHALEXIN 500 MG/1
500 CAPSULE ORAL 4 TIMES DAILY
Qty: 40 CAPSULE | Refills: 0 | Status: SHIPPED | OUTPATIENT
Start: 2017-01-01 | End: 2017-01-01

## 2017-01-01 RX ORDER — PROPRANOLOL HYDROCHLORIDE 10 MG/1
10 TABLET ORAL 3 TIMES DAILY
Qty: 90 TABLET | Refills: 3 | Status: SHIPPED | OUTPATIENT
Start: 2017-01-01 | End: 2017-01-01

## 2017-04-16 PROBLEM — K92.2 GASTROINTESTINAL HEMORRHAGE, UNSPECIFIED GASTROINTESTINAL HEMORRHAGE TYPE: Status: ACTIVE | Noted: 2017-01-01

## 2017-04-16 PROBLEM — K72.00 ACUTE LIVER FAILURE WITHOUT HEPATIC COMA: Status: ACTIVE | Noted: 2017-01-01

## 2017-04-16 PROBLEM — R73.9 HYPERGLYCEMIA: Status: ACTIVE | Noted: 2017-01-01

## 2017-04-16 PROBLEM — K92.2 GASTROINTESTINAL HEMORRHAGE: Status: ACTIVE | Noted: 2017-01-01

## 2017-04-16 PROBLEM — D64.9 ANEMIA: Status: ACTIVE | Noted: 2017-01-01

## 2017-04-16 PROBLEM — D69.6 THROMBOCYTOPENIA (HCC): Status: ACTIVE | Noted: 2017-01-01

## 2017-04-16 NOTE — H&P
[facility] HOSPITALIST  History and Physical     Essie Heal Patient Status:  Emergency    3/19/1980 MRN [id number]   Location 6[address] [facility] Attending Doug Olson MD   Hosp Day # 0 PCP Sofia Conroy     Chief Complai mucous membranes. Icteric. Respiratory: Clear to auscultation bilaterally. No wheezes. No rhonchi. Cardiovascular: S1, S2. Regular rate and rhythm. No murmurs, rubs or gallops. Equal pulses. Abdomen: Soft, tender epigastrium, nondistended.   Positive kenroy

## 2017-04-16 NOTE — ED PROVIDER NOTES
Patient Seen in: [facility] Emergency Department    History   Patient presents with:  GI Bleeding (gastrointestinal)  Jaundice (gastrointestinal, hematologic)    Stated Complaint: vomiting up blood, hx of liver problems    HPI    Patient is a 37-yea 04/16/17 0224 98 %   O2 Device 04/16/17 0224 None (Room air)       Current:/104 mmHg  Pulse 95  Temp(Src) 98.7 °F (37.1 °C) (Temporal)  Resp 20  Ht 170.2 cm (5' 7\")  Wt 86.183 kg  BMI 29.75 kg/m2  SpO2 98%  LMP 03/29/2017        Physical Exam    GEN 0.3-0.7 heparin anti-Xa units/mL.      AMMONIA, PLASMA - Abnormal; Notable for the following:     Ammonia 98 (*)     All other components within normal limits   CBC W/ DIFFERENTIAL - Abnormal; Notable for the following:     RBC 3.50 (*)     HGB 10.7 (*) liver failure and cirrhosis. Critical care time was  35 minutes.  This critical care time is exclusive of procedures critical care time includes monitoring of patient's cardiopulmonary and hemodynamic status, interpretation of laboratory values, and discus

## 2017-04-16 NOTE — ANESTHESIA POSTPROCEDURE EVALUATION
[facility] Patient Status:  Inpatient   Age/Gender 30year old female MRN [id number]   [facility] 4SW-A Attending Yovany Laureano MD   Hosp Day # 0 PCP Gaurang Thompson       Anesthesia Post-op Note    Procedure(s

## 2017-04-16 NOTE — ANESTHESIA PREPROCEDURE EVALUATION
PRE-OP EVALUATION    Patient Name: Collin Baez    Pre-op Diagnosis: Acute gastrointestinal hemorrhage [K92.2]    Procedure(s):  ESOPHAGOGASTRODUODENOSCOPY (EGD) with possible banding with anesthesia    Surgeon(s) and Role:     * Kate Bronson MD - [START ON 4/17/2017] CefTRIAXone Sodium (ROCEPHIN) 1 g in sodium chloride 0.9 % 100 mL IVPB Add-vantage 1 g Intravenous Q24H       Outpatient Medications:    No outpatient prescriptions have been marked as taking for the 4/16/17 encounter [place]  NPO status verified and patient meets guidelines. Post-procedure pain management plan discussed with surgeon and patient.       Plan/risks discussed with: patient                Present on Admission:   • Hyperglycemia  • Thrombocytopenia ([address]

## 2017-04-16 NOTE — CONSULTS
[address] [facility] Patient Status:  Inpatient    3/19/1980 MRN [id number]   [facility] 4SW-A Attending Aparna Turner MD   Hosp Day # 0 CYNTHIA Solis     Date of Admission: 2017  Admission Diagnosis: Susan Casanovashelby in sodium chloride 0.9 % 100 mL infusion, 8 mg/hr, Intravenous, Continuous  •  0.9%  NaCl infusion, , Intravenous, Continuous  •  LORazepam (ATIVAN) injection 1 mg, 1 mg, Intravenous, Q30 Min PRN  •  LORazepam (ATIVAN) injection 2 mg, 2 mg, Intravenous, Q1 04/16/2017    04/16/2017   CO2 27.0 04/16/2017   BUN 5 04/16/2017   CREATSERUM 0.68 04/16/2017    04/16/2017   CA 7.9 04/16/2017   ALKPHO 228 04/16/2017   ALT 28 04/16/2017   AST 77 04/16/2017   BILT 4.2 04/16/2017   ALB 2.5 04/16/2017   TP 8.

## 2017-04-16 NOTE — CONSULTS
Gastroenterology Initial Consultation  I have personally seen and examined the patient.     Patient Name: Kenia Lozada  Referring physician: Dr. Fercho Lovett  Reason for consultation: Hematemesis  CC: \"I vomited blood\"  HPI: This is a 31 yo woman with a hist Sodium (PROTONIX) 40 mg in Sodium Chloride 0.9 % 10 mL IV push 40 mg Intravenous Once   [COMPLETED] ondansetron HCl (ZOFRAN) injection 4 mg 4 mg Intravenous Once   [COMPLETED] ondansetron HCl (ZOFRAN) 4 MG/2ML injection      [COMPLETED] sodium chloride 0.9 reports a family history of alcoholism  ROS:  In addition to the pertinent positives described above:             Infectious Disease:  No chronic infections or recent fevers prior to the acute illness            Cardiovascular: No history of CAD, prior MI, right costal margin, and tenderness over the liver edge; no rebound or guarding;  No ascites is clinically apparent; no tympany to percussion  Ext: No peripheral edema or cyanosis  Skin: Warm and dry  Psychiatric: Appropriate mood and congruent affect witho setting, these labs can look the same with acute hepatic failure as with acute alcohol hepatitis. She has been stabilized in the ICU. She apparently has known esophageal varices from prior endoscopy in October, but reports that she was not banded.   Her p
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
## 2017-04-16 NOTE — OPERATIVE REPORT
EGD operative report  Patient Name: Winifred Hameed  Procedure: Esophagogastroduodenoscopy with variceal band ligation  Indication: Hematemesis  Attending: Ryley Miller M.D.   Consent:  The risks, benefits, and alternatives were discussed with the dodie and descending duodenum were normal, without masses, polyps, ulcers, erosions, diverticula, or varices. The [employer] 7 Speed  was affixed to the upper endoscope, and re-introduced into the esophagus.   The endoscope was advanced to t

## 2017-04-17 NOTE — PLAN OF CARE
BEHAVIOR    • Pt/Family maintain appropriate behavior and adhere to behavioral management agreement, if implemented Progressing        GASTROINTESTINAL - ADULT    • Minimal or absence of nausea and vomiting Progressing    • Maintains or returns to baseline

## 2017-04-17 NOTE — PROGRESS NOTES
Pulmonary Progress Note        NAME: Natalia Alarcon - ROOM: [address] [place] [address] - MRN: [id number] - Age: 30year old - : 3/19/1980        SUBJECTIVE: No events overnight, no complaints this AM    OBJECTIVE:   17  0500 17  0508 17  0600 17 ARTERIALPCO2, ARTERIALHCO3    No results for input(s): BNP in the last 72 hours.     Invalid input(s): TROPI      INR   Date/Time Value Ref Range Status   04/17/2017 04:55 AM 1.63* 0.89-1.11 Final   Comment:     New lot of PT reagent started on February 28,

## 2017-04-17 NOTE — PLAN OF CARE
Assumed care of patient at 0700. A&Ox4, VSS. No hematemesis, denies nausea. EGD done today. Tolerating clear liquids.      GASTROINTESTINAL - ADULT    • Minimal or absence of nausea and vomiting Progressing    • Maintains or returns to baseline bowel functi

## 2017-04-17 NOTE — PROGRESS NOTES
04/17/17 1850   Clinical Encounter Type   Visited With Patient   Sacramental Encounters   Sacrament of Sick-Anointing Anointed  (Father Kevon Davis provided care, support, scripture and Sacrament of Anointing)

## 2017-04-17 NOTE — PROGRESS NOTES
[facility] HOSPITALIST  Progress Note     Daryle Rhyme Patient Status:  Inpatient    3/19/1980 MRN [id number]   [facility] 4SW-A Attending Gaby Dover MD   Hosp Day # 1 PCP Gaurang Thompson     Chief Complaint: GI bleed    S: Patie Intravenous Q24H   • pantoprazole (PROTONIX) IV push  40 mg Intravenous Q12H       ASSESSMENT / PLAN:     1. Esophageal variceal bleed  1. S/p banding  2. Continue PPI, octreotide drip  3. Continue antibiotic prophylaxis with ceftriaxone  4.  Q12H CBC, quintanilla

## 2017-04-17 NOTE — PAYOR COMM NOTE
Attending Physician: Adan Mckeon MD    Review Type: ADMISSION   Reviewer: Keerthi Ellis       Date: April 17, 2017 - 10:50 AM  Payor: [id number]  Authorization Number: [id number]/A  Admit date: 4/16/2017  2:21 AM   Admitted from E 6355 New Bag 50 mcg/hr Intravenous Malreen Apodaca RN    4/16/2017 2126 New Bag 50 mcg[facility]    4/16/2017 1319 New Bag 50 mcg/hr Intravenous Jessica Nielsen, RN      Pantoprazole Sodium (PROTONIX) 40 mg in Sodium Chloride Glucose 107 (*)     BUN 7 (*)     Calcium, Total 7.7 (*)     Alkaline Phosphatase 167 (*)     AST 56 (*)     Bilirubin, Total 4.5 (*)     Albumin 2.2 (*)     All other components within normal limits   PROTHROMBIN TIME (PT) - Abnormal; Notable for the URINE   URINALYSIS WITH CULTURE REFLEX   TYPE AND SCREEN    Narrative: The following orders were created for panel order TYPE AND SCREEN.   Procedure                               Abnormality         Status                     ---------

## 2017-04-17 NOTE — PROGRESS NOTES
[facility]  Progress Note    Johan Morton Patient Status:  Inpatient    3/19/1980 MRN [id number]   [facility] 4SW-A Attending Ty Pop MD   [facility] Day # 1 PCP Gaurang Thompson     Subjective:  Johan Morton is a(n) 61 without ascites (HCC)     Gastrointestinal hemorrhage, unspecified gastrointestinal hemorrhage type     Acute liver failure without hepatic coma      Impression  1. Variceal bleed s/p banding  2. Portal HTN  3.  Abnormal imaging with pancreas head lesion  4

## 2017-04-18 NOTE — PROGRESS NOTES
[facility]  Progress Note    Nupur Hernandez Patient Status:  Inpatient    3/19/1980 MRN [id number]   [facility] 4SW-A Attending Regina Villalpando MD   [facility] Day # 2 PCP Ute Shahid     Subjective:  Nupur Hernandez is a(n) 64 ascites (HCC)     Gastrointestinal hemorrhage, unspecified gastrointestinal hemorrhage type     Acute liver failure without hepatic coma      Impression  1. Variceal bleed s/p banding  2. Portal HTN  3. Abnormal imaging with pancreas head lesion  4.  Post h

## 2017-04-18 NOTE — PLAN OF CARE
Patient A&Ox4, no S&S of ETOH withdrawal, VSS. 1 black tarry stool. Awaiting transfer to CTU 3.     GASTROINTESTINAL - ADULT    • Minimal or absence of nausea and vomiting Progressing    • Maintains or returns to baseline bowel function Progressing

## 2017-04-18 NOTE — PLAN OF CARE
PT RECEIVED FROM ICU A/O, COOPERATIVE, CIWA SCORES 0 THROUGHOUT MOST OF NIGHT EXCEPT FOR SOME ITCHING, BENADRYL GIVEN WITH RELIEF, SCLERA YELLOW, , 98% ON RA, SR, OCTREOTIDE GTT INFUSING, IV ANTIBIOTIC GIVEN, REFUSED SCDS, IVF INSUSING, SEIZURE PRECAUTI

## 2017-04-18 NOTE — BH LEVEL OF CARE ASSESSMENT
Level of Care Assessment Note    General Questions  Why are you here?: Pt reports coming in d/t medical concerns  Precipitating Events: Pt reports hx of ETOH but d/t her medical condition she should not drink.  Pt reports her last drink was on Saturday 4/15 Use: Saturday 4/15/17                  Withdrawal Symptoms  History of Withdrawal Symptoms: Denies past symptoms  Current Withdrawal Symptoms: No                             Mental Status  Appearance Characteristics: Appropriate clothing  Mood: Calm  Affec

## 2017-04-18 NOTE — PROGRESS NOTES
Pt transferred to [phone] in stable condition in stable condition via wheelchair. All belongings sent w/ pt. Report given to receiving RN.

## 2017-04-18 NOTE — PLAN OF CARE
NURSING DISCHARGE NOTE    Discharged Home via Ambulatory. Accompanied by self  Belongings Taken by patient/family. Patient given discharge paperwork and scripts. Verbalizes understanding.

## 2017-04-19 NOTE — DISCHARGE SUMMARY
[facility] [place] HOSPITALIST  DISCHARGE SUMMARY     Brett Mt Patient Status:  Inpatient    3/19/1980 MRN [id number]   [facility] 3NE-A Attending No att. providers found   Hosp Day # 2 PCP Edgar Melendrez     Date of Admission: 2017  D performed emergent EGD. EGD showed esophageal varices with active bleeding which were banded. Patient was treated afterwards medically with IV PPI, an octreotide drip, and IV ceftriaxone for prophylaxis.  Patient's hemoglobin dropped expectedly but she did rebound or guarding. Neurologic: No focal neurological deficits. Musculoskeletal: Moves all extremities. Extremities: No edema.   -----------------------------------------------------------------------------------------------  PATIENT DISCHARGE INSTRUCT

## 2017-04-19 NOTE — PAYOR COMM NOTE
A    Review Type: CONTINUED STAY  Reviewer: Alysa Mccormack     Date: April 19, 2017 - 3:38 PM  Payor: [id number]  Authorization Number: N/A  Admit date: 4/16/2017  2:21 AM        Date of Discharge: 4/18/2017  Discharge Dispositio

## 2017-05-09 NOTE — OPERATIVE REPORT
Nupur Hernandez Patient Status:  Hospital Outpatient Surgery    3/19/1980 MRN [id number]   [facility] ENDOSCOPY Attending Jannie Estrada MD   Hosp Day # 0 PCP No primary care provider on file.      PREOPERATIVE DIAGNOSIS/INDICATION: H/

## 2017-05-09 NOTE — H&P
[address] Patient Status:  Hospital Outpatient Surgery    3/19/1980 MRN [id number]   [facility] ENDOSCOPY Attending Rosa Caceres MD   Hosp Day # 0 PCP No primary care provider on file.      CC:

## 2017-05-09 NOTE — ANESTHESIA POSTPROCEDURE EVALUATION
510 [facility] Patient Status:  Hospital Outpatient Surgery   Age/Gender 30year old female MRN [id number]   Location [facility]. Attending Yael Pace MD   Hosp Day # 0 PCP No primary care provider on file.        An

## 2017-05-09 NOTE — ANESTHESIA PREPROCEDURE EVALUATION
PRE-OP EVALUATION    Patient Name: Surinder Gomez    Pre-op Diagnosis: SCREENING     Procedure(s):  ESOPHAGOGASTRODUODENOSCOPY WITH VARICIES BANDING    Surgeon(s) and Role:     Soni Cisneros MD - Primary    Pre-op vitals reviewed.   Temp: 98.7 °F (3 reviewed.     Lab Results  Component Value Date   WBC 4.8 04/18/2017   RBC 2.43* 04/18/2017   HGB 7.5* 04/18/2017   HCT 24.0* 04/18/2017   MCV 98.8 04/18/2017   MCH 30.9 04/18/2017   MCHC 31.3 04/18/2017   RDW 15.6 04/18/2017   PLT 78.0* 04/18/2017       La

## 2017-06-05 NOTE — ED INITIAL ASSESSMENT (HPI)
Pt sts that she has esophogeal varcies and sts that she noticed BRB & dark black stools this am. Pt c/o dizziness and abd cramping. Pt denies n/v. Pt sts that she had banding done for the varcies, but was suppose to get a second banding done and never did.

## 2017-06-05 NOTE — ED PROVIDER NOTES
Patient Seen in: [facility] Emergency Department    History   Patient presents with:  GI Bleeding (gastrointestinal)    Stated Complaint: rectal bleeding    HPI    22-year-old woman with alcoholic cirrhosis and esophageal varices comes in for bloody 18   Temp 06/05/17 1258 97.9 °F (36.6 °C)   Temp src 06/05/17 1258 Temporal   SpO2 06/05/17 1258 99 %   O2 Device 06/05/17 1258 None (Room air)       Current:/44 mmHg  Pulse 84  Temp(Src) 97.9 °F (36.6 °C) (Temporal)  Resp 18  Ht 170.2 cm (5' 7\")  W components within normal limits   CBC W/ DIFFERENTIAL - Abnormal; Notable for the following:     RBC 3.51 (*)     HGB 9.7 (*)     HCT 31.5 (*)     .0 (*)     MCHC 30.8 (*)     RDW 19.0 (*)     RDW-SD 61.0 (*)     All other components within normal l Prescribed:  Current Discharge Medication List        Present on Admission           ICD-10-CM Noted POA    Gastrointestinal hemorrhage K92.2 4/16/2017 Unknown

## 2017-06-05 NOTE — H&P
[facility] HOSPITALIST  History and Physical     [facility] Patient Status:  Emergency    3/19/1980 MRN [id number]   Location [address] Attending Philomena Conrad MD   Hosp Day # 0 PCP No primary care provider on file.      Ch total) by mouth every morning before breakfast. Disp: 30 tablet Rfl: 3       Review of Systems:   A comprehensive 14 point review of systems was completed. Pertinent positives and negatives noted in the HPI.     Physical Exam:    BP 94/47 mmHg  Pulse 81 cirrhosis  3. Transaminitis secondary to #2  4. Thrombocytopenia secodnary to #2  5. Normocytic anemia  1. Monitor       Quality:  · DVT Prophylaxis: SCD  · CODE status: full  · Nguyen: no    Plan of care discussed with patient.      Hanover Shahla, DO  6/5/20

## 2017-06-05 NOTE — CONSULTS
[facility]    Gastroenterology Initial Consultation    Yumikosaima Oropezairais Patient Status:  Emergency    3/19/1980 MRN [id number]   Location [address] [facility] Attending Eliazar Higgins MD   Hosp Day # 0 PCP No primary care provider on gain, sleep disturbance. Neurological: Denies frequent headaches, recent passing out, recent dizziness, convulsions/seizures.   Cardiovascular: Denies history of heart murmur, leg swelling, chest pain or pressure after eating or when upset, angina, irregul 0.79 06/05/2017   BUN 8 06/05/2017    06/05/2017   K 4.9 06/05/2017    06/05/2017   CO2 29.0 06/05/2017    06/05/2017   CA 7.9 06/05/2017   ALB 1.8 06/05/2017   ALKPHO 211 06/05/2017   BILT 5.8 06/05/2017   TP 8.0 06/05/2017   AST 59 06/

## 2017-06-06 NOTE — INTERVAL H&P NOTE
Pre-op Diagnosis: add on    The above referenced H&P was reviewed by Naveen Koch DO on 6/6/2017, the patient was examined and no significant changes have occurred in the patient's condition since the H&P was performed.   I discussed with the patient an

## 2017-06-06 NOTE — OPERATIVE REPORT
EGD WITH BANDING      [facility] Patient Status:  Inpatient    3/19/1980 MRN [id number]   [facility] ENDOSCOPY Attending Yovanny Smyth, [address] [facility] Day # 1 PCP No primary care provider on file.        PREOPERATIVE DIAGNO visualized post procedure. Stomach: Portal hypertensive gastropathy. Duodenum: The duodenal bulb was normal. The examined descending duodenum was normal.      IMPRESSION:   1.  One grade III varix with \"nipple sign\" and two additional columns of g

## 2017-06-06 NOTE — H&P (VIEW-ONLY)
[facility]    Gastroenterology Initial Consultation    Essie Shen Patient Status:  Emergency    3/19/1980 MRN [id number]   Location [address] Attending Conchita Tabares MD   Hosp Day # 0 PCP No primary care provider on gain, sleep disturbance. Neurological: Denies frequent headaches, recent passing out, recent dizziness, convulsions/seizures.   Cardiovascular: Denies history of heart murmur, leg swelling, chest pain or pressure after eating or when upset, angina, irregul 0.79 06/05/2017   BUN 8 06/05/2017    06/05/2017   K 4.9 06/05/2017    06/05/2017   CO2 29.0 06/05/2017    06/05/2017   CA 7.9 06/05/2017   ALB 1.8 06/05/2017   ALKPHO 211 06/05/2017   BILT 5.8 06/05/2017   TP 8.0 06/05/2017   AST 59 06/

## 2017-06-06 NOTE — INTERVAL H&P NOTE
Pre-op Diagnosis: add on    The above referenced H&P was reviewed by Radha Palomino DO on 6/6/2017, the patient was examined and no significant changes have occurred in the patient's condition since the H&P was performed.   I discussed with the patient an

## 2017-06-06 NOTE — PLAN OF CARE
Patient had EGD today, procedure tolerated well. Patient had 1 small black stool after EGD, vital signs stable. Patient denies nausea, clear liquids tolerated well. HGB 9.2 @ 1800.

## 2017-06-06 NOTE — PLAN OF CARE
PATIENT HAS HAD NO EVIDANCE OF BLEEDING DURING SHIFT, NO EVIDANCE OF TREMORS, PATIENT ADMITTED SHE HASNT HAD WINE FOR A WEEK.

## 2017-06-06 NOTE — PLAN OF CARE
GASTROINTESTINAL - ADULT    • Maintains or returns to baseline bowel function Not Progressing    • Maintains adequate nutritional intake (undernourished) Not Progressing          GASTROINTESTINAL - ADULT    • Maintains or returns to baseline bowel function

## 2017-06-06 NOTE — PLAN OF CARE
CARDIOVASCULAR - ADULT    • Maintains optimal cardiac output and hemodynamic stability Not Progressing        GASTROINTESTINAL - ADULT    • Maintains or returns to baseline bowel function Not Progressing    • Maintains adequate nutritional intake (undernou

## 2017-06-06 NOTE — PROGRESS NOTES
Patient had 2 episodes of BRBPR tonight,  Recheck Hemoglogin 9.7-----7.8    Will transfuse 1 unit PRBCs    Willow Dean M.D.   [facility]

## 2017-06-06 NOTE — PROGRESS NOTES
[facility] HOSPITALIST  Progress Note     Jose Hancock Patient Status:  Inpatient    3/19/1980 MRN [id number]   [facility] ENDOSCOPY Attending Shakir Llanes, [address] Day # 1 PCP No primary care provider on file.      Chief Complaint: Dark s HCl  10 mg Oral TID   • pantoprazole (PROTONIX) IV push  40 mg Intravenous Q12H       ASSESSMENT / PLAN:     1. Melena suspected secondary to bleeding from esophogeal varices  1. Previously has had banding performed    2. Continue octreotide gtt  3.  BID PP

## 2017-06-06 NOTE — ANESTHESIA POSTPROCEDURE EVALUATION
[address] [facility] Patient Status:  Inpatient   Age/Gender 30year old female MRN [id number]   Location 118 [facility]. Attending Torito Huertas, [address] [facility] Day # 1 PCP No primary care provider on file.        Anesthesia Post-op Note

## 2017-06-06 NOTE — ANESTHESIA PREPROCEDURE EVALUATION
PRE-OP EVALUATION    Patient Name: Natalia Alarcon    Pre-op Diagnosis: add on    Procedure(s):  ESOPHAGOGASTRODUODENOSCOPY WITH MAC    Surgeon(s) and Role:     * Jitendra Cabrera, DO - Primary    Pre-op vitals reviewed.   Temp: 98.3 °F (36.8 °C)  Pulse: 66 total) by mouth every morning before breakfast. Disp: 30 tablet Rfl: 3       Allergies: Review of patient's allergies indicates no known allergies. Anesthesia Evaluation    Patient summary reviewed.     Anesthetic Complications  (-) history of anesthet Admission:   **None**

## 2017-06-06 NOTE — PAYOR COMM NOTE
Attending Physician: Shakir Llanes DO    6/5    ED     Patient presents with:  GI Bleeding     Stated Complaint: rectal bleeding    HPI    57-year-old woman with alcoholic cirrhosis and esophageal varices comes in for bloody stools bright red blood per re the following:     PT 20.1 (*)     INR 1.76 (*)     All other components within normal limits   URINALYSIS WITH CULTURE REFLEX - Abnormal; Notable for the following:     Urine Color Praveena (*)     Clarity Urine Slightly Cloudy (*)     Spec Gravity 1.031 (*) individual orders. BLOOD TYPE, ABO AND RH D   ANTIBODY SCREEN   PREPARE RBC   RAINBOW DRAW BLUE   RAINBOW DRAW GOLD   RAINBOW DRAW LAVENDER   RAINBOW DRAW LIGHT GREEN         . Diagnosis:    GI BLEED  1.  Melena suspected secondary to bleeding from eso

## 2017-06-07 NOTE — PLAN OF CARE
GASTROINTESTINAL - ADULT    • Maintains or returns to baseline bowel function Progressing    • Maintains adequate nutritional intake (undernourished) Progressing          Pt A/O x 4. Rm air. Active BS x 4 quadrants. Denies N/V, tolerating soft diet.  Pt w/2

## 2017-06-07 NOTE — PROGRESS NOTES
[facility]    fGastroenterology Follow-Up Note      Selma Schwartz Patient Status:  Inpatient    3/19/1980 MRN [id number]   [facility] 4NW-A Attending Velasquez Medley, [address] [facility] Day # 2 PCP No primary care provider on file.      Chief

## 2017-06-07 NOTE — DISCHARGE SUMMARY
[facility]OSPITAL[facility]  DISCHARGE SUMMARY     [facility] Patient Status:  Inpatient    3/19/1980 MRN [id number]   [facility] 4NW-A Attending Lazarus Handy, [facility] Day # 2 PCP No primary care provider on file.      Date of Admission:  • EGD with banding of esophogeal varices    Incidental or significant findings and recommendations (brief descriptions):  • None    Lab/Test results pending at Discharge:   · None    Consultants:  • GI    Discharge Medication List:     Discharge Matty Roy minutes

## 2017-06-08 NOTE — PAYOR COMM NOTE
--------------  CONTINUED STAY REVIEW    Payor: [id number] [facility]  Authorization Number: N/A    Admit date: 6/5/2017  1:23 PM       Admitting Physician: Kate Hein DO  Attending Physician:  No att. providers found  Primary Care Ph

## 2017-07-27 NOTE — ANESTHESIA PREPROCEDURE EVALUATION
PRE-OP EVALUATION    Patient Name: Ryanne Figueroa    Pre-op Diagnosis: ALCOHLIC CIRRHOSIS, ESOPHAGEAL VARICES, JAUNDICE     Procedure(s):  ESOPHAGOGASTRODUODENOSCOPY     Surgeon(s) and Role:     Yusef Perez MD - Primary    Pre-op vitals reviewed. TONSILLECTOMY  4/16/17,5/9/17,6/6/17: UPPER GI ENDOSCOPY,EXAM      Comment: with banding     Smoking status: Never Smoker    Smokeless tobacco: Never Used    Alcohol use No       Drug use: No     Available pre-op labs reviewed.     Lab Results  Component Va

## 2017-07-27 NOTE — OPERATIVE REPORT
Aurora Salter Patient Status:  Outpatient in a Bed    3/19/1980 MRN [id number]   [facility] ENDOSCOPY Attending January Burk MD   Hosp Day # 0 PCP Gaurang Thompson     PREOPERATIVE DIAGNOSIS/INDICATION: Cirrhosis h/o variceal b

## 2017-07-27 NOTE — INTERVAL H&P NOTE
Pre-op Diagnosis: ALCOHLIC CIRRHOSIS, ESOPHAGEAL VARICES, JAUNDICE     The above referenced H&P was reviewed by Chance Gilmore MD on 7/27/2017, the patient was examined and no significant changes have occurred in the patient's condition since the H&P wa

## 2017-08-21 PROBLEM — E87.1 HYPONATREMIA: Status: ACTIVE | Noted: 2017-01-01

## 2017-08-21 PROBLEM — K70.31 ALCOHOLIC CIRRHOSIS OF LIVER WITH ASCITES (HCC): Status: ACTIVE | Noted: 2017-01-01

## 2017-08-21 PROBLEM — E80.6 HYPERBILIRUBINEMIA: Status: ACTIVE | Noted: 2017-01-01

## 2017-08-21 NOTE — ED PROVIDER NOTES
Patient Seen in: [facility] Emergency Department    History   Patient presents with:  Jaundice (gastrointestinal, hematologic)    Stated Complaint: nausea/vomiting, dizziness, shortness of breath, increasing jaundice, abd diste*    HPI    Patient is shortness of breath, increasing jaundice, abd diste*  Other systems are as noted in HPI. Constitutional and vital signs reviewed. All other systems reviewed and negative except as noted above.     PSFH elements reviewed from today and agreed except as following:     PTT 55.5 (*)     All other components within normal limits    Narrative: The aPTT Heparin Therapeutic Range is approximately 65- 104 seconds. The therapeutic range has been validated against 0.3-0.7 heparin anti-Xa units/mL.      COMP MET basilar atelectasis/infiltrates.     Dictated by: Will Carrasco MD on 8/21/2017 at 16:01     Approved by: Will Carrasco MD          ============================================================  ED Course  ----------------------------------------------

## 2017-08-21 NOTE — ED INITIAL ASSESSMENT (HPI)
Pt states has esop varacies, states hasnt eaten solids in 3 weeks, c/o jaundiced, abd pain and distention,has egd scheduled in am

## 2017-08-22 NOTE — PAYOR COMM NOTE
--------------  ADMISSION REVIEW     Payor: Jimmy Rinaldi #:  [id number]  Authorization Number: [id number]    Admit date: 8/21/17  Admit time: 2004       Admitting Physician: Jose Armando Pulido MD  Attending Physician: daily.   magnesium 250 MG Oral Tab,  Take 500 mg by mouth 2 (two) times daily. Ferrous Sulfate (IRON) 325 (65 Fe) MG Oral Tab,  Take 1 tablet by mouth daily. Coenzyme Q10 (CO Q 10) 10 MG Oral Cap,  Take 10 mg by mouth daily.      multivitamin Oral Tab Labs Reviewed   BILIRUBIN, TOTAL/DIRECT, SERUM - Abnormal; Notable for the following:        Result Value    Bilirubin, Total 16.3 (*)     Bilirubin, Direct 12.0 (*)     All other components within normal limits   PROTHROMBIN TIME (PT) - Abnormal; Nota STATED HISTORY: (As transcribed by Technologist)  Patient presents with jaundice, dizziness, shortness of breath, abdomen distension, and loss of appetite for five days. FINDINGS:  Mild cardiac enlargement which may be magnified due to AP technique.  Sma 11:05 PM     Author:  Randi Wray MD Service:  Hospitalist Author Type:  Physician    Filed:  8/21/2017 11:05 PM Date of Service:  8/21/2017  9:42 PM Status:  Signed    :  Randi Wray MD (Physician)           [facility]  History and Phy Rfl:    Ferrous Sulfate (IRON) 325 (65 Fe) MG Oral Tab Take 1 tablet by mouth daily. Disp:  Rfl:        Review of Systems:   A comprehensive 14 point review of systems was completed. Pertinent positives and negatives noted in the HPI.     Physical Exa likely need diagnostic and therapeutic paracentesis and EGD for eval of esophageal varices as this was scheduled as an outpt  2. Recent bleeding from esophageal varices s/p banding, Hgb stable  3. Thrombocytopenia/anemia, due to above, counts stable  4.  GE steatosis. Hepatomegaly. Biliary Tree: There is no evidence of intra or extra-hepatic duct dilatation. The common bile duct diameter measures 2 mm. Gallbladder: The gallbladder is contracted. Cholelithiasis. . There is no gall bladder wall thickenin INDICATIONS:  nausea/vomiting, dizziness, shortness of breath, increasing jaundice, abd disteneded     PATIENT STATED HISTORY: (As transcribed by Technologist)  Patient presents with jaundice, dizziness, shortness of breath, abdomen distension, and loss

## 2017-08-22 NOTE — ANESTHESIA PREPROCEDURE EVALUATION
PRE-OP EVALUATION    Patient Name: Winifred Hameed    Pre-op Diagnosis: RECALL ESOPHAGOGASTRODUEODENOSCOPY    Procedure(s):  ESOPHAGOGASTRODUODENOSCOPY WITH VARICES BANDING    Surgeon(s) and Role:     Ruth Pope MD - Primary    Pre-op mónica Alex MG Oral Tab Take 500 mg by mouth 2 (two) times daily. Disp:  Rfl:    Ferrous Sulfate (IRON) 325 (65 Fe) MG Oral Tab Take 1 tablet by mouth daily. Disp:  Rfl:        Allergies: Review of patient's allergies indicates no known allergies.       Anesthesia Ev Admission:  • Hyponatremia  • Alcoholic cirrhosis of liver with ascites (HCC)  • Hyperbilirubinemia  • Coagulopathy (HCC)

## 2017-08-22 NOTE — OPERATIVE REPORT
Essie Shen Patient Status:  Inpatient    3/19/1980 MRN [id number]   [facility] ENDOSCOPY Attending Tez Holt MD   Hosp Day # 1 PCP Arcelia Gil MD     PREOPERATIVE DIAGNOSIS/INDICATION: Dysphagia, cirrhosis, h/o variceal b [place]  8/22/2017  12:24 PM

## 2017-08-22 NOTE — PROGRESS NOTES
NURSING ADMISSION NOTE      Patient admitted via Cart  Oriented to room. Safety precautions initiated. Bed in low position.   Call light in reach.    ----------------------------------------------------------  Admission navigator completed  Patient is

## 2017-08-22 NOTE — OR NURSING
Dr. Nichole Corbett aware of patient's pain to epigastric area a \"6\". Order obtained for 0.5 mg Dilaudid SIVP x1 and may repeat X1. Transportation here to  patient. Patient states her pain is a \"5\" and is asking where her belongings are.  Phone is in z

## 2017-08-22 NOTE — CONSULTS
Gastroenterology Initial Consultation  I have personally seen and examined the patient.     Patient Name: Essie Shen  Referring physician: Dr. Kalyani Braswell  Reason for consultation: Jaundice  CC: \"I was yellow, and really tired\"  HPI: This is a 31 yo wo ferrous sulfate EC tab 325 mg 325 mg Oral Daily   Magnesium Oxide tab 500 mg 500 mg Oral BID   Propranolol HCl (INDERAL) tab 20 mg 20 mg Oral TID Beta Blocker/Cardiac   acetaminophen (TYLENOL) tab 650 mg 650 mg Oral Q6H PRN   ondansetron HCl (ZOFRAN) injec ENT: The patient reports no hoarseness of voice, hearing loss, sinus congestion, tinnitus           Neurologic: The patient reports no history of seizure, stroke, or frequent headaches    PE: /69 (BP Location: Left arm)   Pulse 85   Temp 98. MCH  33.2   MCHC  32.1   RDW  15.2   NEPRELIM  4.74   WBC  6.8   PLT  73.0*       Recent Labs   Lab  08/21/17   1551   ALT  45   AST  191*       US ABDOMEN COMPLETE WITH DOPPLER(CPT=76700/09046) Once [[id number]] Collected: 08/21/17 9227   Order Status: Com Spleen: Splenomegaly. Spleen measures 17.6 cm in diameter.     Prominent lymph nodes within the gee hepatis.     Ascites.  Left-sided pleural effusion.     DOPPLER:  Peak Systolic Velocities in cm/sec:     Main Hepatic Artery: 152, hepatopetal flow     Ri PROCEDURE DESCRIPTION: A regular upper endoscope was introduced into the patient’s mouth, hypo pharynx, esophagus, stomach and the first and second portion of the duodenum, retroflexion of the endoscope was performed in the stomach.  Careful examination of Plan EGD today with possible dilation            Ceftriaxone 2 grams IV x 1 dose today, given the possible need for esophageal dilation in patient with varices            I have had extensive conversation about the absolute importance of complete

## 2017-08-22 NOTE — PLAN OF CARE
Patient/Family Goals    • Patient/Family Long Term Goal Progressing    • Patient/Family Short Term Goal Progressing        A/O x 4. Full liquid diet. NPO at midnight for paracentesis tomorrow AM.  EGD today with 4 esophageal bandings.   FFP scheduled for 4

## 2017-08-22 NOTE — H&P
[facility] HOSPITALIST  History and Physical     Kenia Lozada Patient Status:  Inpatient    3/19/1980 MRN [id number]   [facility] 3NE-A Attending Kasia Johnson MD   Hosp Day # 0 PCP Ned Dewitt MD     Chief Complaint: Nausea, Mar Ha Take 500 mg by mouth 2 (two) times daily. Disp:  Rfl:    Ferrous Sulfate (IRON) 325 (65 Fe) MG Oral Tab Take 1 tablet by mouth daily. Disp:  Rfl:        Review of Systems:   A comprehensive 14 point review of systems was completed.     Pertinent positives reviewed and suggestive of portal HTN  5. GI to eval, will likely need diagnostic and therapeutic paracentesis and EGD for eval of esophageal varices as this was scheduled as an outpt  2. Recent bleeding from esophageal varices s/p banding, Hgb stable  3.

## 2017-08-22 NOTE — IMAGING NOTE
Nathanael Jauregui RN notified of the elevated INR.  Will wait to get the INR to be below 2 for para

## 2017-08-22 NOTE — PROGRESS NOTES
[facility]PITAL[facility]  Progress Note     Del Bloodgood Patient Status:  Inpatient    3/19/1980 MRN [id number]   [facility] 3NE-A Attending Grupo Saha MD   Hosp Day # 1 PCP Emily Sow MD     Chief Complaint: Cirrhosis    S: Spero Covert 20 mg Oral TID Beta Blocker/Cardiac   • Pantoprazole Sodium  40 mg Oral QAM AC       ASSESSMENT / PLAN:     1. Alcoholic cirrhosis with esophageal varices sp banding, ascites and portal hypertension  2.  Hyperbilirubinemia, thrombocytopenia and coagulopath

## 2017-08-22 NOTE — BH LEVEL OF CARE ASSESSMENT
Level of Care Assessment Note            Level of Care Assessment Note    General Questions  Why are you here?: Patient states she couldnt eat and she was turning yellow. Precipitating Events: She admit to drinking wine or hard liquor.   History of Present irritability;Isolative; Loss of interest  Anxiety Symptoms: Generalized  Panic Attacks: states daily anxiety with out panic attacks. Bipolar Symptoms: No problems reported or observed  Sleep Pattern: Difficulty falling asleep; Disturbed/interrupted sleep  N No  Possible Abuse Reportable to[de-identified] Not appropriate for reporting to authorities    Mental Status  Appearance Characteristics: Appropriate clothing  Mood: Depressed;Calm;Helpless  Affect: Congruent  Eye Contact: Fair  Level of Consciousness: Alert  Exhibite

## 2017-08-23 NOTE — PROCEDURES
[facility]  Procedure Note    Katerina Rogers Patient Status:  Inpatient    3/19/1980 MRN [id number]   [facility] 3NE-A Attending Nicole Matute MD   [facility] Day # 2 PCP Ted Avendaño MD     Procedure: Paracentesis    Pre-Procedure Shruthi Su

## 2017-08-23 NOTE — PROGRESS NOTES
[facility]  Progress Note     Collin Baez Patient Status:  Inpatient    3/19/1980 MRN [id number]   [facility] 3NE-A Attending Bishop Robby JENKINS   Psychiatric Day # 2 PCP Ryan Duff MD     Chief Complaint: Cirrhosis    S: Patient r hours.         Imaging: Imaging data reviewed in Epic.     Medications:   • Potassium Chloride ER  40 mEq Oral Once   • HydrOXYzine HCl  25 mg Oral Once   • escitalopram  5 mg Oral Daily   • pantoprazole (PROTONIX) IV push  40 mg Intravenous Q24H   • ferrou

## 2017-08-23 NOTE — DISCHARGE SUMMARY
[facility] [place] [facility]PITAL[facility]  DISCHARGE SUMMARY     Skippy Nims Patient Status:  Inpatient    3/19/1980 MRN [id number]   [facility] 3NE-A Attending Stevie Oropeza MD   [facility] Day # 2 PCP Carlos Brooks MD     Date of Admission: 2017  Date o Patient recently had banding of esophageal varices which she states she does almost monthly. She was scheduled for a repeat EGD tomorrow. Over the weekend, patient admits to drinking and states that her symptoms have progressively worsened since then.   Vel Edmonds mg total) by mouth daily. Quantity:  30 tablet  Refills:  1     furosemide 20 MG Tabs  Commonly known as:  LASIX      Take 1 tablet (20 mg total) by mouth daily.    Quantity:  30 tablet  Refills:  1     HydrOXYzine HCl 25 MG Tabs  Commonly known as:  ATAR Topanga   9/20/2017 3:00 PM [facility]       -----------------------------------------------------------------------------------------------  PATIENT DISCHARGE INSTRUCTIONS: See electronic chart    HUA Samaniego 8/23/2

## 2017-08-23 NOTE — PLAN OF CARE
Patient/Family Goals    • Patient/Family Long Term Goal Progressing    • Patient/Family Short Term Goal Progressing        Patient alert and oriented x4. Up ad lakesha. Denies pain. Abdomen distended and tender. Generalized jaundice noted to skin and sclera.  Shelton Lombard

## 2017-08-23 NOTE — CONSULTS
[facility]  Report of Psychiatric Consultation    Ranny Beverage Patient Status:  Inpatient    3/19/1980 MRN [id number]   [facility] 3NE-A Attending Felicity Brooks MD   Hosp Day # 2 PCP Jim Krishnan MD     Date of Admission:   can stay sober for at least 3 months is 9/10. She has no hx of DUI's or DT's or withdrawal seizures. Currently, she c/o anxiety and depressed mood, guilty and ashamed feelings, fatigue, anxiety, difficulty sleeping, and some feelings of worthlessness.  Pamela Ramírez thoughts.      Mental Status Exam:     Risk Assessment  Suicidal ideation: no suicidal ideation  Homicidal ideation: None noted    Objective       08/23/17  0955   BP: 93/62   Pulse: 86   Resp: 18   Temp:        Appearance: fair grooming, jaundiced,   Behav

## 2017-08-23 NOTE — PROGRESS NOTES
Gastroenterology Progress Note  Patient Name: Edmar Gibbs  Chief Complaint: alcohol cirrhosis with hepatic decompensation, dysphagia  S: The patient reports that she is feeling much better today. She tolerated full liquids last night. No chest pain. on Propranolol. We are likely to be limited in increasing that dose, based on BP.     Plan: Diagnostic paracentesis today to rule out SBP              Low residue diet             Psych liason to see patient             From a GI standpoint, she can be dis

## 2017-08-23 NOTE — BH PROGRESS NOTE
Pt was given referrals per Dr. Edyta Wooten to f/u  an cd outpt program.  The nurse is aware of the plan.

## 2017-08-23 NOTE — PLAN OF CARE
Patient/Family Goals    • Patient/Family Long Term Goal Progressing    • Patient/Family Short Term Goal Progressing        Patient is alert and orientated x4. Denies pain or discomfort. VSS, NSR on tele, room air.  Over night used 2L O2. 2 units FFP finishe

## 2017-08-23 NOTE — PROGRESS NOTES
NURSING DISCHARGE NOTE    Discharged Home via Wheelchair. Accompanied by Support staff  Belongings Taken by patient/family. Patient given med rec and discharge instructions. Prescriptions given to pt.  instructions for labs in he next week and appoint

## 2017-08-23 NOTE — IMAGING NOTE
US guided paracentesis with Dr. Talita Ornelas. Pt tolerated well. 2.5 L removed. VSS. Puncture site and dressing intact. Report to Yessenia Resendez. Transport to room 3601.

## 2017-08-27 NOTE — PROGRESS NOTES
[facility]    Patients Name: Mavis Ness  Attending Physician: Geovanna att. providers found  CSN: [id number]    Location:  0234/8333-P  MRN: [id number]    YOB: 1980  Admission Date: 8/21/2017     Anesthesia Post-op Note    Procedure(s):  E

## 2017-09-05 NOTE — OPERATIVE REPORT
Nupur Rhasha Patient Status:  Hospital Outpatient Surgery    3/19/1980 MRN [id number]   [facility] ENDOSCOPY Attending Jannie Estrada MD   Hosp Day # 0 PCP Roslyn Jay MD     PREOPERATIVE DIAGNOSIS/INDICATION: Cirrhosis, h/o [place]  9/5/2017  1:10 PM

## 2017-09-05 NOTE — H&P (VIEW-ONLY)
Gastroenterology Initial Consultation  I have personally seen and examined the patient.     Patient Name: Ryanne Figueroa  Referring physician: Dr. Boris Johnson  Reason for consultation: Jaundice  CC: \"I was yellow, and really tired\"  HPI: This is a 31 yo wo ferrous sulfate EC tab 325 mg 325 mg Oral Daily   Magnesium Oxide tab 500 mg 500 mg Oral BID   Propranolol HCl (INDERAL) tab 20 mg 20 mg Oral TID Beta Blocker/Cardiac   acetaminophen (TYLENOL) tab 650 mg 650 mg Oral Q6H PRN   ondansetron HCl (ZOFRAN) injec ENT: The patient reports no hoarseness of voice, hearing loss, sinus congestion, tinnitus           Neurologic: The patient reports no history of seizure, stroke, or frequent headaches    PE: /69 (BP Location: Left arm)   Pulse 85   Temp 98. MCH  33.2   MCHC  32.1   RDW  15.2   NEPRELIM  4.74   WBC  6.8   PLT  73.0*       Recent Labs   Lab  08/21/17   1551   ALT  45   AST  191*       US ABDOMEN COMPLETE WITH DOPPLER(CPT=76700/63293) Once [[id number]] Collected: 08/21/17 4867   Order Status: Com Spleen: Splenomegaly. Spleen measures 17.6 cm in diameter.     Prominent lymph nodes within the gee hepatis.     Ascites.  Left-sided pleural effusion.     DOPPLER:  Peak Systolic Velocities in cm/sec:     Main Hepatic Artery: 152, hepatopetal flow     Ri PROCEDURE DESCRIPTION: A regular upper endoscope was introduced into the patient’s mouth, hypo pharynx, esophagus, stomach and the first and second portion of the duodenum, retroflexion of the endoscope was performed in the stomach.  Careful examination of Plan EGD today with possible dilation            Ceftriaxone 2 grams IV x 1 dose today, given the possible need for esophageal dilation in patient with varices            I have had extensive conversation about the absolute importance of complete

## 2017-09-05 NOTE — INTERVAL H&P NOTE
Pre-op Diagnosis: CIRRHOSIS    The above referenced H&P was reviewed by Dru Peraza MD on 9/5/2017, the patient was examined and no significant changes have occurred in the patient's condition since the H&P was performed.   I discussed with the patien

## 2017-09-05 NOTE — ANESTHESIA POSTPROCEDURE EVALUATION
[address] [facility] Patient Status:  Hospital Outpatient Surgery   Age/Gender 30year old female MRN [id number]   Location [facility]. Attending Alistair Delatorre MD   Hosp Day # 0 PCP Sherly Ramachandran MD       Anesthesia Post

## 2017-09-05 NOTE — ANESTHESIA PREPROCEDURE EVALUATION
PRE-OP EVALUATION    Patient Name: Yumiko Calle    Pre-op Diagnosis: CIRRHOSIS    Procedure(s):  ESOPHAGOGASTRODUODENOSCOPY WITH VARICES BANDING    Surgeon(s) and Role:     Bayron Hou MD - Primary    Pre-op vitals reviewed.   Temp: 98.6 °F (37 date: OTHER      Comment: wisdom teeth extraction  No date: TONSILLECTOMY  4/16/17,5/9/17,6/6/17: UPPER GI ENDOSCOPY,EXAM      Comment: with banding     Smoking status: Never Smoker    Smokeless tobacco: Never Used    Alcohol use No    Comment: none since

## 2017-09-12 NOTE — ED PROVIDER NOTES
Patient Seen in: [facility] Emergency Department    History   Patient presents with:  GI Bleeding (gastrointestinal)    Stated Complaint: bleeding-hx of varacies    HPI    37-year-old woman who has hepatic cirrhosis.   She had esophageal banding last air)    Current:/44   Pulse 84   Temp 98 °F (36.7 °C) (Temporal)   Resp 14   Ht 170.2 cm (5' 7\")   Wt 80.7 kg   LMP 08/25/2017   SpO2 98%   BMI 27.88 kg/m²         Physical Exam    General:  Vitals as listed.   No acute distress   HEENT: Sclerae show -----------         ------                     CBC W/ DIFFERENTIAL[[id number]]          Abnormal            Final result                 Please view results for these tests on the individual orders. TYPE AND SCREEN    Narrative:      The

## 2017-09-12 NOTE — ED INITIAL ASSESSMENT (HPI)
Pt c/o bright red rectal bleeding x2 pta. Pt with hx of esophageal varices. Banding done last Tuesday. Denies pain.

## 2017-09-17 PROBLEM — D64.9 ANEMIA, UNSPECIFIED TYPE: Status: ACTIVE | Noted: 2017-01-01

## 2017-09-17 PROBLEM — I85.01 BLEEDING ESOPHAGEAL VARICES (HCC): Status: ACTIVE | Noted: 2017-01-01

## 2017-09-17 PROBLEM — I85.01 BLEEDING ESOPHAGEAL VARICES, UNSPECIFIED ESOPHAGEAL VARICES TYPE (HCC): Status: ACTIVE | Noted: 2017-01-01

## 2017-09-17 NOTE — ED INITIAL ASSESSMENT (HPI)
Pt, who has a hx of esophageal varices and cirrhosis of liver, c/o vomiting blood x 30 minutes. +Dizziness. She also gets banded every two weeks.

## 2017-09-17 NOTE — ED NOTES
Pt finishes 1st unit of type O NEG blood; second unit started in ER before writer takes pt upstairs on monitor.

## 2017-09-17 NOTE — CM/SW NOTE
Page re: transfer to to a different hospital.  SW spoke with RN and he notes that they needed assistance 2 hrs ago, so took care of things themselves and pt is being transferred right now.

## 2017-09-17 NOTE — ED PROVIDER NOTES
Patient Seen in: [facility] Emergency Department    History   Patient presents with:  Bleeding (hematologic)    Stated Complaint: vomiting blood, hx of varices     HPI    Patient is a 29-year-old female comes in emergency room for evaluation of hema agreed except as otherwise stated in HPI.     Physical Exam   ED Triage Vitals [09/17/17 0302]  BP: (!) 81/57  Pulse: 95  Resp: 20  Temp: 97.6 °F (36.4 °C)  Temp src: Temporal  SpO2: 99 %  O2 Device: n/a    Current:BP (!) 77/40   Pulse 82   Temp 97.6 °F (36 W/ DIFFERENTIAL - Abnormal; Notable for the following:     RBC 2.20 (*)     HGB 7.2 (*)     HCT 22.5 (*)     .0 (*)     .3 (*)     RDW-SD 53.2 (*)     All other components within normal limits   CBC WITH DIFFERENTIAL WITH PLATELET    Wallene Debbie Reviewed: 8/21/2017          ICD-10-CM Noted POA    Bleeding esophageal varices ([facility] [address] I85.01 9/17/2017 Unknown

## 2017-09-17 NOTE — OPERATIVE REPORT
EGD operative report  Patient Name: Neil Barrera  Procedure: Esophagogastroduodenoscopy with control of bleeding  Indication: Hematemesis, cirrhosis  Attending: Niurka Carrington M.D.   Consent:  The risks, benefits, and alternatives were discussed with angularis were normal, without masses, polyps, ulcers, erosions, diverticula, or varices. Portal hypertensive gastropathy was appreciated as well.     Duodenum: The duodenal bulb, post-bulbar duodenum, and descending duodenum were normal, without masses, p

## 2017-09-17 NOTE — ANESTHESIA POSTPROCEDURE EVALUATION
[address] [facility] Patient Status:  Inpatient   Age/Gender 30year old female MRN [id number]   [facility] 4SW-A Attending Sera Oconnell MD   Hosp Day # 0 PCP David Ivory MD       Anesthesia Post-op Note    Procedure(s):

## 2017-09-17 NOTE — H&P
Chart reviewed and case discussed with MIKE Saldañaw. Pt came in early this morning with hematemesis, has h/o cirrhosis with variceal bleeding. hgb 7.2 on admit. Went emergently to EGD and interventions were attempted including epi/clipping and banding.  She has

## 2017-09-17 NOTE — PROGRESS NOTES
Pt received at change of shift in bed able to communicate her needs. Pt is conversative and explained the nature of her varices. Pt stooling old blood. Pt was prepped for an OR banding of varices which included giving her 2 of FFP.  Pt was transported to OR

## 2017-09-17 NOTE — PLAN OF CARE
GASTROINTESTINAL - ADULT    • Maintains or returns to baseline bowel function Not Progressing        HEMATOLOGIC - ADULT    • Maintains hematologic stability Not Progressing    • Free from bleeding injury Not Progressing          GASTROINTESTINAL - ADULT

## 2017-09-17 NOTE — ANESTHESIA PREPROCEDURE EVALUATION
PRE-OP EVALUATION    Patient Name: Jeremias Roy    Pre-op Diagnosis: Hematemesis [K92.0]    Procedure(s):  ESOPHAGOGASTRODUODENOSCOPY WITH BANDING    Surgeon(s) and Role:     * Daniel Dave MD - Primary    Pre-op vitals reviewed.   Temp: 98.8 °F (37 mg by mouth 2 (two) times daily. Disp:  Rfl:    Ferrous Sulfate (IRON) 325 (65 Fe) MG Oral Tab Take 1 tablet by mouth daily. Disp:  Rfl:        Allergies: Review of patient's allergies indicates no known allergies.       Anesthesia Evaluation    Patient s

## 2017-09-17 NOTE — RESPIRATORY THERAPY NOTE
0930 Pt received for OR, intubated. Pt committed to mech-vent, settings per Anaesthesia. (vc+ 12, vt 600, fi02 40%, peep +5). ETT secured with commercial devise. 1000 ABG done and charted.

## 2017-09-17 NOTE — CONSULTS
[address] [facility] Patient Status:  Inpatient    3/19/1980 MRN [id number]   [facility] 4SW-A Attending Patricia Ayala MD   Hosp Day # 0 PCP Roslyn Jay MD     Date of Admission: 2017  Admission Diagnosis: A Age of Onset   • Cancer Paternal Grandmother          Home Medications:    Outpatient Prescriptions Marked as Taking for the 9/17/17 encounter [facility] Encounter):  furosemide 20 MG Oral Tab Take 1 tablet (20 mg total) by mouth daily.  Disp: 30 tablet Rfl: exertion, denies cough, hemoptysis, wheezing, pleurisy  GI: + nausea, vomiting,hematemesis, melena, abdominal pain  : denies hematuria, dysuria, hesitancy, or incontinence  Musculoskeletal: denies arthralgias, myalgias, muscle weakness, or joint swelling 102.3*   MCH  32.7   MCHC  32.0   RDW  14.2   NEPRELIM  5.20   WBC  8.0   PLT  129.0*       Recent Labs   Lab  09/17/17   0318   INR  2.24*   PTT  50.1*     Imaging: I have independently visualized all relevant chest imaging in PACS.   I agree with the radi

## 2017-09-17 NOTE — PROGRESS NOTES
ICU  Critical Care APN Progress Note    NAME: Jeremias Roy - [facility] - MRN: [id number] - Age: 30year old - :3/19/1980    History Of Present Illness:  Jeremias Roy is a 30year old female with PMHx significant for ETOH cirrhosis, depression unlabored  Heart: Regular rate and rhythm, S1 and S2 normal, no murmur, rub or gallop  Abdomen: Soft, non-tender, bowel sounds active all four quadrants, rounded  Extremities: Extremities normal, atraumatic, no cyanosis or edema,capillary refill <3 sec. varices  -GI to do EGD in OR this am  -Octreotide gtt  -Protonix BID  -Rocephin for SBP prophylaxis  -FFP x 2, already received 2 units PRBC  -Hgb Q12h  -Hold Propranolol     2. Ascites/Cirrhosis  -Paracentesis- diagnostic   -Abdominal US    3.  Anxiety - r

## 2017-09-17 NOTE — CONSULTS
Gastroenterology Initial Consultation  I have personally seen and examined the patient.     Patient Name: Levar Dewitt  Referring physician: Dr. Ananda Montalvo  Reason for consultation: Hematemesis  CC: I vomited blood  HPI: This is a 31 yo woman who is well Surgical History:  08/2017: ABD PARACENTESIS  No date: OTHER      Comment: wisdom teeth extraction  No date: TONSILLECTOMY  4/16/17,5/9/17,6/6/17: UPPER GI ENDOSCOPY,EXAM      Comment: with banding, several  Medications:   [COMPLETED] Pantoprazole Sodium ( reports no hoarseness of voice, hearing loss, sinus congestion, tinnitus           Neurologic: The patient reports no history of seizure, stroke, or frequent headaches    PE: BP (!) 84/40   Pulse 83   Temp 97.6 °F (36.4 °C) (Temporal)   Resp 18   Ht 67\" with alcohol liver disease. She is presumed to have cirrhosis. Her liver chemistries and Tbili have improved significantly since she has stopped all alcohol.   She has portal hypertension, which has lead to persistently large varices, with recurrent bleed

## 2017-09-21 NOTE — PAYOR COMM NOTE
--------------  ADMISSION REVIEW     Payor: Jimmy Rinaldi #:  [id number]  Authorization Number: [id number]    Admit date: 9/17/17  Admit time: Roland Thompson       Admitting Physician: Jayshree Pittman MD  Attending Physi several          Review of Systems    Positive for stated complaint: vomiting blood, hx of varices   Other systems are as noted in HPI. Constitutional and vital signs reviewed. All other systems reviewed and negative except as noted above.     PSFH el PROTHROMBIN TIME (PT) - Abnormal; Notable for the following:     PT 25.2 (*)     INR 2.24 (*)     All other components within normal limits   PTT, ACTIVATED - Abnormal; Notable for the following:     PTT 50.1 (*)     All other components within normal li given IV Protonix. She was started on octreotide drip. Patient given Rocephin. She was consented for blood transfusion after discussing risks and benefits. O- blood was given and started here in ER.  1 unit of platelets was given.   Case discussed with in early this morning with hematemesis, has h/o cirrhosis with variceal bleeding. hgb 7.2 on admit. Went emergently to EGD and interventions were attempted including epi/clipping and banding. She has been on octreotide/protonix/ceftriaxone.  She was kept in arrived and took some of her personal belongings with him. All other belongings sent with the ambulance crew. Updates called in to Dr. Sadiq Schilling, blood blank and other care providers. Pt left hospital with ambulance crew at [phone].  She had propofol,octreotide a

## 2017-10-26 NOTE — ED INITIAL ASSESSMENT (HPI)
Patient c/o swelling from abd down to ankles. Recent d/c from [facility] [place] ascites. Slight SOB.

## 2017-10-26 NOTE — ED PROVIDER NOTES
Patient Seen in: [facility] Emergency Department    History   Patient presents with:  Abdomen/Flank Pain (GI/)    Stated Complaint: abd swelling    HPI    Wendy Holcomb is a pleasant 53-year-old female with a history of cirrhosis and history of TIPS proce except as otherwise stated in HPI.     Physical Exam   ED Triage Vitals [10/26/17 0054]  BP: 134/83  Pulse: 98  Resp: 18  Temp: 98.3 °F (36.8 °C)  Temp src: Oral  SpO2: 97 %  O2 Device: None (Room air)    Current:/83   Pulse 98   Temp 98.3 °F (36.8 °C ---------                               -----------         ------                     CBC W/ DIFFERENTIAL[[id number]]          Abnormal            Final result                 Please view results for these tests on the individual orders.    SCAN SLIDE

## 2017-11-08 PROBLEM — J18.9 NOSOCOMIAL PNEUMONIA: Status: ACTIVE | Noted: 2017-01-01

## 2017-11-08 PROBLEM — K70.31 ASCITES DUE TO ALCOHOLIC CIRRHOSIS (HCC): Status: ACTIVE | Noted: 2017-01-01

## 2017-11-08 PROBLEM — R06.00 DYSPNEA: Status: ACTIVE | Noted: 2017-01-01

## 2017-11-08 PROBLEM — R06.00 DYSPNEA, UNSPECIFIED TYPE: Status: ACTIVE | Noted: 2017-01-01
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
## 2017-11-08 NOTE — ED PROVIDER NOTES
Patient Seen in: [facility] Emergency Department    History   Patient presents with:  Dyspnea NIRALI SOB (respiratory)    Stated Complaint: nirali    HPI    Patient is a pleasant 51-year-old female, with alcohol induced cirrhosis, status post TIPS procedu Temp 98.8 °F (37.1 °C) (Temporal)   Resp 18   Wt 86.2 kg   LMP 10/16/2017   SpO2 97%   BMI 29.76 kg/m²     Physical Exam    Vital signs are reviewed noted as documented in the nursing chart.    GENERAL: Patient is awake and alert, resting comfortably on th Urine 4.0 (*)     All other components within normal limits   PROCALCITONIN - Abnormal; Notable for the following:     Procalcitonin 5.11 (*)     All other components within normal limits   CBC W/ DIFFERENTIAL - Abnormal; Notable for the following:     RBC Doppler(anv=59999/41087)    Result Date: 11/8/2017  PROCEDURE:  US ABDOMEN COMPLETE WITH DOPPLER(CPT=76700/04335)  COMPARISON:  SHREYA , CT ABDOMEN(W+WO)PELVIS(CNTRST ONLY)(CPT=74178), 4/17/2017, 13:32.   INDICATIONS:  nirali  TECHNIQUE:  Real time gray-scale 2128  ------------------------------------------------------------       MDM     Patient was placed on a cart, an IV was established, and blood was drawn and sent to the laboratory for further evaluation.   Patient was observed while the laboratory and radi Date Reviewed: 8/21/2017          ICD-10-CM Noted POA    Dyspnea R06.00 11/8/2017 Unknown

## 2017-11-08 NOTE — ED NOTES
Pt awake and alert, appears comfortable. Pt c/o cold symptoms and congestion over past few days.  Pt states she has had weight gain over past month and does feel like abdomen is more distended than usual, but denies abdominal pain and states appetite is nor

## 2017-11-08 NOTE — ED INITIAL ASSESSMENT (HPI)
C/o SOB, bloating, swelling to abd for the past 4-5 days. Dry cough  States she's been out of water pill for over 2 wks.  States she's gained 30 plus lbs in the past month

## 2017-11-09 NOTE — PAYOR COMM NOTE
--------------  ADMISSION REVIEW     Payor: Jimmy Rinaldi #:  [id number]  Authorization Number: [id number]    Admit date: 11/8/17  Admit time: 2214       Admitting Physician: Bruno Barrios DO  Attending Physician systems reviewed and negative except as noted above.     Physical Exam[SE.1]   ED Triage Vitals  BP: 110/60 [11/08/17 1609]  Pulse: 90 [11/08/17 1609]  Resp: 20 [11/08/17 1609]  Temp: 98.8 °F (37.1 °C) [11/08/17 1609]  Temp src: Temporal [11/08/17 1609]  Sp Abnormal; Notable for the following:     PTT 51.9 (*)     All other components within normal limits   URINALYSIS WITH CULTURE REFLEX - Abnormal; Notable for the following:     Urine Color Praveena (*)     Urobilinogen Urine 4.0 (*)     All other components wi color flow, and spectral waveform analysis were performed of the portal vein, hepatic artery, hepatic vein, and splenic vein. The exam includes images of the liver, gallbladder, common bile duct, pancreas, spleen, kidneys, IVC, and aorta.   PATIENT STATED were reviewed and discussed with the patient. Case was reviewed with Dr. Rafael Snow. Abdominal ultrasound with Doppler was requested. Patient will be admitted to facilitate close observation and further treatment/intervention.   Findings on the chest x-ray and Physical      Patient presents with:  Dyspnea KAI SOB (respiratory)       PCP: Dillan Reynolds MD    History of Present Illness: Patient is a 30year old female with PMH sig for esophageal varices sp tips, presents for eval of abdominal bloating, and Data Review:    LABS:     Lab Results  Component Value Date   WBC 7.4 11/09/2017   HGB 8.3 11/09/2017   HCT 26.2 11/09/2017   PLT 82.0 11/09/2017   CREATSERUM 0.90 11/09/2017   BUN 19 11/09/2017    11/09/2017   K 3.7 11/09/2017    11/09/201 kidneys, IVC, and aorta. PATIENT STATED HISTORY: (As transcribed by Technologist)  Patient complains of bloating and abdominal pain. FINDINGS:  ABDOMEN LIVER:  Coarse echotexture can be seen with cirrhosis. No focal mass lesion.  BILIARY:  Multiple gall cap/tab 25 mg     Date Action Dose Route User    11/9/2017 1439 Given 25 mg Oral Jason Bautista RN    11/8/2017 2359 Given 25 mg Oral Yanci Norris, RN      furosemide (LASIX) injection 40 mg     Date Action Dose Route User    11/9/2017 1044 Give

## 2017-11-09 NOTE — PROGRESS NOTES
[facility] Dosing Service  Antibiotic Dosing    Jeff Rizvi is a 30year old female for whom pharmacy is dosing Zosyn for treatment of  Pneumonia. Allergies: has No Known Allergies.     Vitals: /41   Pulse 88   Temp 98.7 °F (37.1 °C) (Oral)

## 2017-11-09 NOTE — CONSULTS
[facility]                       Gastroenterology [address] [facility] Gastroenterology    [facility] Patient Status:  Inpatient    3/19/1980 MRN [id number]   [facility] 3NE-A Attending Louis Montana MD   [address] [facility] Day # 1 PCP PARACENTESIS  No date: OTHER      Comment: wisdom teeth extraction  No date: TONSILLECTOMY  4/16/17,5/9/17,6/6/17: UPPER GI ENDOSCOPY,EXAM      Comment: with banding, several  Medications:   Potassium Chloride ER (K-DUR M20) CR tab 40 mEq 40 mEq Oral Once of recurrent urinary tract infections, hematuria, dysuria, or nephrolithiasis           Psychiatric: + hx of EtOH abuse            Oncologic: The patient reports no history of prior solid tumor or hematologic malignancy           ENT: The patient reports n NA  133*  135*   K  3.8  3.7   CL  98*  100*   CO2  28.0  28.0     Recent Labs   Lab  11/09/17   0626   RBC  2.61*   HGB  8.3*   HCT  26.2*   MCV  100.4*   MCH  31.8   MCHC  31.7   RDW  15.9   NEPRELIM  5.51   WBC  7.4   PLT  82.0*       Recent Labs   La patent.     Bilateral pleural effusions.      Impression:     CONCLUSION:       1. Findings of a cirrhotic liver.      2. Cholelithiasis without biliary duct obstruction.     3.  The TIPS appears to be patent and functioning.      Dictated by: Cony Bautista, diuretics. On exam, she is pain-free with mild/moderate distention, no lower extremity edema. Will plan on continuing diuretics and maintaining a low salt diet     Recommendations:     1. Resume Lasix; continue home dose of Inderal   2.  Start low sodium di

## 2017-11-09 NOTE — PLAN OF CARE
No care plans would carry over and they are all progressing. Full code, Ax4, non-pitting BLLE edema. Jaundiced. Refused SCD's. Zosyn. RA productive cough, , abdomen-soft but tender. More firm on the flanks. Up ad lakesha. PIV: .9/83.   Elelctrolyte p

## 2017-11-09 NOTE — H&P
[facility] Hospitalist History and Physical      Patient presents with:  Dyspnea KAI SOB (respiratory)       PCP: Shad Scott MD      History of Present Illness: Patient is a 30year old female with PMH sig for etoh cirrhosis w esophageal varices sp tips, d atraumatic. Eyes:  Sclera icteric    Nose: Nares normal. Septum midline. Mucosa normal. No drainage.    Throat: Lips, mucosa, and tongue normal. Teeth and gums normal.   Neck: Supple, symmetrical, trachea midline, no cervical or supraclavicular lymph jagdeep mildly enlarged. No pneumothorax. Followup is advised.   Dictated by: Kee Long MD on 11/08/2017 at 17:39     Approved by: Kee Long MD            Us Abdomen Complete With Doppler(cpt=76700/96475)    Result Date: 11/8/2017  PROCEDURE:  [address] [facility] appears to be patent and functioning.       Dictated by: Megha Paulino MD on 11/08/2017 at 19:33     Approved by: Megha Paulino MD               Assessment/Plan:     Dyspnea  - secondary to combination of volume overload, ascites and possible gram negative

## 2017-11-09 NOTE — PROGRESS NOTES
Full GI Consult to Follow    Physician Addendum  This patient was seen and examined independently, then discussed with GISEL Jimenez. The plan was discussed with GISEL and her note above was reviewed.   In summary, patient with EtOH liver disease and p

## 2017-11-09 NOTE — ED NOTES
Pt still in [address] at this time. US contacted and states they are waiting to hear back from the radiologist and pt should be back shortly unless additional images are needed.

## 2017-11-10 NOTE — DISCHARGE SUMMARY
General Medicine Discharge Summary     Patient ID:  Johan Morton  30year old  3/19/1980    Admit date: 11/8/2017    Discharge date and time: 11/10/2017    Attending Physician: Wally Benitez mouth daily. levofloxacin 750 MG Oral Tab  Take 1 tablet (750 mg total) by mouth daily. CONTINUE these medications which have NOT CHANGED    Propranolol HCl 10 MG Oral Tab  Take 1 tablet (10 mg total) by mouth 3 (three) times daily.     escitalopram

## 2017-11-10 NOTE — PLAN OF CARE
No care plans are crossing over. All are progressing. Pt. Is Ax4, drowsy. VSS, will continue to monitor. Does have some swelling on face neck. Started on diuretics.   Probable D/C

## 2017-11-10 NOTE — PLAN OF CARE
Assumed pt care @ 3810. VSS. Denies pain. Lungs diminished. IV Zosyn administered as ordered. Up ad lakesha in room. Anxious for d/c. Cleared by all services for discharge. Will continue to monitor until discharge.

## 2017-11-10 NOTE — PLAN OF CARE
NURSING DISCHARGE NOTE    Discharged Home via Ambulatory. Accompanied by RN  Belongings Taken by patient/family. Discharge paperwork including 3 printed prescriptions given to & discussed with pt PTD.  Pt verbalized understanding of discharge & foll

## 2017-11-10 NOTE — PLAN OF CARE
Patient states she is feeling better, states sob resolved. She wants to go home, she has been tearful at times.   VSS, she denies pain

## 2017-11-13 NOTE — PAYOR COMM NOTE
--------------  DISCHARGE REVIEW    Payor: Jimmy Rinaldi #:  [id number]  Authorization Number: [id number]    Admit date: 11/8/17  Admit time:  2214  Discharge Date: 11/10/2017 11:30 AM     Admitting Physician: Isaac Muller elevated procal noted  - improved w IV diuresis and IV abx w low NA diet  - needs compliance with meds as op  - oral lasix and aldactone at dc     Decompensated  w/ ho portal HTN sp TIPs  - diuretics resumed, fu Gi as OP     GERD  - PPI     Hyponatremia-re

## 2018-01-01 ENCOUNTER — APPOINTMENT (OUTPATIENT)
Dept: ULTRASOUND IMAGING | Facility: HOSPITAL | Age: 38
DRG: 432 | End: 2018-01-01
Attending: NURSE PRACTITIONER
Payer: MEDICAID

## 2018-01-01 ENCOUNTER — APPOINTMENT (OUTPATIENT)
Dept: GENERAL RADIOLOGY | Facility: HOSPITAL | Age: 38
DRG: 432 | End: 2018-01-01
Attending: EMERGENCY MEDICINE
Payer: MEDICAID

## 2018-01-01 ENCOUNTER — HOSPITAL ENCOUNTER (INPATIENT)
Facility: HOSPITAL | Age: 38
LOS: 3 days | Discharge: ANOTHER HOSPITAL | DRG: 432 | End: 2018-01-01
Attending: EMERGENCY MEDICINE | Admitting: INTERNAL MEDICINE
Payer: MEDICAID

## 2018-01-01 ENCOUNTER — APPOINTMENT (OUTPATIENT)
Dept: ULTRASOUND IMAGING | Facility: HOSPITAL | Age: 38
DRG: 432 | End: 2018-01-01
Attending: EMERGENCY MEDICINE
Payer: MEDICAID

## 2018-01-01 ENCOUNTER — APPOINTMENT (OUTPATIENT)
Dept: GENERAL RADIOLOGY | Facility: HOSPITAL | Age: 38
DRG: 432 | End: 2018-01-01
Attending: INTERNAL MEDICINE
Payer: MEDICAID

## 2018-01-01 ENCOUNTER — APPOINTMENT (OUTPATIENT)
Dept: CT IMAGING | Facility: HOSPITAL | Age: 38
DRG: 432 | End: 2018-01-01
Attending: INTERNAL MEDICINE
Payer: MEDICAID

## 2018-01-01 ENCOUNTER — APPOINTMENT (OUTPATIENT)
Dept: GENERAL RADIOLOGY | Facility: HOSPITAL | Age: 38
DRG: 432 | End: 2018-01-01
Attending: NURSE PRACTITIONER
Payer: MEDICAID

## 2018-01-01 ENCOUNTER — HOSPITAL ENCOUNTER (INPATIENT)
Facility: HOSPITAL | Age: 38
LOS: 7 days | Discharge: ANOTHER HOSPITAL | DRG: 432 | End: 2018-01-01
Attending: EMERGENCY MEDICINE | Admitting: INTERNAL MEDICINE
Payer: MEDICAID

## 2018-01-01 VITALS
RESPIRATION RATE: 20 BRPM | HEART RATE: 90 BPM | WEIGHT: 186 LBS | DIASTOLIC BLOOD PRESSURE: 53 MMHG | TEMPERATURE: 98 F | SYSTOLIC BLOOD PRESSURE: 97 MMHG | OXYGEN SATURATION: 99 % | HEIGHT: 67 IN | BODY MASS INDEX: 29.19 KG/M2

## 2018-01-01 VITALS
HEIGHT: 66 IN | WEIGHT: 202.19 LBS | SYSTOLIC BLOOD PRESSURE: 119 MMHG | DIASTOLIC BLOOD PRESSURE: 65 MMHG | RESPIRATION RATE: 17 BRPM | HEART RATE: 65 BPM | TEMPERATURE: 98 F | BODY MASS INDEX: 32.49 KG/M2 | OXYGEN SATURATION: 91 %

## 2018-01-01 DIAGNOSIS — D64.9 ANEMIA, UNSPECIFIED TYPE: Primary | ICD-10-CM

## 2018-01-01 DIAGNOSIS — R17 JAUNDICE: Primary | ICD-10-CM

## 2018-01-01 DIAGNOSIS — K72.10 CHRONIC LIVER FAILURE WITHOUT HEPATIC COMA (HCC): ICD-10-CM

## 2018-01-01 DIAGNOSIS — J18.9 COMMUNITY ACQUIRED PNEUMONIA OF LEFT LOWER LOBE OF LUNG: ICD-10-CM

## 2018-01-01 DIAGNOSIS — D64.9 ANEMIA, UNSPECIFIED TYPE: ICD-10-CM

## 2018-01-01 LAB
ALBUMIN SERPL-MCNC: 1.9 G/DL (ref 3.5–4.8)
ALBUMIN SERPL-MCNC: 2 G/DL (ref 3.5–4.8)
ALBUMIN SERPL-MCNC: 2.1 G/DL (ref 3.5–4.8)
ALBUMIN SERPL-MCNC: 2.1 G/DL (ref 3.5–4.8)
ALBUMIN SERPL-MCNC: 2.2 G/DL (ref 3.5–4.8)
ALBUMIN SERPL-MCNC: 2.4 G/DL (ref 3.5–4.8)
ALBUMIN SERPL-MCNC: 2.9 G/DL (ref 3.5–4.8)
ALBUMIN SERPL-MCNC: 3.5 G/DL (ref 3.5–4.8)
ALP LIVER SERPL-CCNC: 103 U/L (ref 37–98)
ALP LIVER SERPL-CCNC: 106 U/L (ref 37–98)
ALP LIVER SERPL-CCNC: 109 U/L (ref 37–98)
ALP LIVER SERPL-CCNC: 111 U/L (ref 37–98)
ALP LIVER SERPL-CCNC: 117 U/L (ref 37–98)
ALP LIVER SERPL-CCNC: 120 U/L (ref 37–98)
ALP LIVER SERPL-CCNC: 128 U/L (ref 37–98)
ALP LIVER SERPL-CCNC: 151 U/L (ref 37–98)
ALP LIVER SERPL-CCNC: 85 U/L (ref 37–98)
ALP LIVER SERPL-CCNC: 97 U/L (ref 37–98)
ALT SERPL-CCNC: 19 U/L (ref 14–54)
ALT SERPL-CCNC: 21 U/L (ref 14–54)
ALT SERPL-CCNC: 22 U/L (ref 14–54)
ALT SERPL-CCNC: 23 U/L (ref 14–54)
ALT SERPL-CCNC: 24 U/L (ref 14–54)
ALT SERPL-CCNC: 25 U/L (ref 14–54)
ALT SERPL-CCNC: 25 U/L (ref 14–54)
ALT SERPL-CCNC: 30 U/L (ref 14–54)
AMMONIA: 31 UMOL/L (ref 11–32)
ANTIBODY SCREEN: NEGATIVE
APTT PPP: 47.9 SECONDS (ref 25–34)
APTT PPP: 64.1 SECONDS (ref 25–34)
AST SERPL-CCNC: 127 U/L (ref 15–41)
AST SERPL-CCNC: 49 U/L (ref 15–41)
AST SERPL-CCNC: 62 U/L (ref 15–41)
AST SERPL-CCNC: 62 U/L (ref 15–41)
AST SERPL-CCNC: 69 U/L (ref 15–41)
AST SERPL-CCNC: 70 U/L (ref 15–41)
AST SERPL-CCNC: 74 U/L (ref 15–41)
AST SERPL-CCNC: 76 U/L (ref 15–41)
AST SERPL-CCNC: 77 U/L (ref 15–41)
AST SERPL-CCNC: 87 U/L (ref 15–41)
BASOPHILS # BLD AUTO: 0.01 X10(3) UL (ref 0–0.1)
BASOPHILS # BLD AUTO: 0.01 X10(3) UL (ref 0–0.1)
BASOPHILS # BLD AUTO: 0.02 X10(3) UL (ref 0–0.1)
BASOPHILS # BLD AUTO: 0.03 X10(3) UL (ref 0–0.1)
BASOPHILS # BLD AUTO: 0.05 X10(3) UL (ref 0–0.1)
BASOPHILS # BLD AUTO: 0.06 X10(3) UL (ref 0–0.1)
BASOPHILS NFR BLD AUTO: 0.1 %
BASOPHILS NFR BLD AUTO: 0.1 %
BASOPHILS NFR BLD AUTO: 0.3 %
BASOPHILS NFR BLD AUTO: 0.3 %
BASOPHILS NFR BLD AUTO: 0.5 %
BASOPHILS NFR BLD AUTO: 0.6 %
BILIRUB DIRECT SERPL-MCNC: 15.2 MG/DL (ref 0.1–0.5)
BILIRUB DIRECT SERPL-MCNC: 17.8 MG/DL (ref 0.1–0.5)
BILIRUB SERPL-MCNC: 24.1 MG/DL (ref 0.1–2)
BILIRUB SERPL-MCNC: 25.4 MG/DL (ref 0.1–2)
BILIRUB SERPL-MCNC: 25.6 MG/DL (ref 0.1–2)
BILIRUB SERPL-MCNC: 26 MG/DL (ref 0.1–2)
BILIRUB SERPL-MCNC: 26.1 MG/DL (ref 0.1–2)
BILIRUB SERPL-MCNC: 26.3 MG/DL (ref 0.1–2)
BILIRUB SERPL-MCNC: 26.5 MG/DL (ref 0.1–2)
BILIRUB SERPL-MCNC: 36.2 MG/DL (ref 0.1–2)
BILIRUB SERPL-MCNC: 37.3 MG/DL (ref 0.1–2)
BILIRUB SERPL-MCNC: 43.9 MG/DL (ref 0.1–2)
BLOOD TYPE BARCODE: 600
BUN BLD-MCNC: 11 MG/DL (ref 8–20)
BUN BLD-MCNC: 17 MG/DL (ref 8–20)
BUN BLD-MCNC: 17 MG/DL (ref 8–20)
BUN BLD-MCNC: 23 MG/DL (ref 8–20)
BUN BLD-MCNC: 26 MG/DL (ref 8–20)
BUN BLD-MCNC: 27 MG/DL (ref 8–20)
BUN BLD-MCNC: 28 MG/DL (ref 8–20)
BUN BLD-MCNC: 29 MG/DL (ref 8–20)
BUN BLD-MCNC: 37 MG/DL (ref 8–20)
BUN BLD-MCNC: 43 MG/DL (ref 8–20)
BUN BLD-MCNC: 8 MG/DL (ref 8–20)
BUN BLD-MCNC: 9 MG/DL (ref 8–20)
BUN BLD-MCNC: 9 MG/DL (ref 8–20)
CALCIUM BLD-MCNC: 7.8 MG/DL (ref 8.3–10.3)
CALCIUM BLD-MCNC: 7.9 MG/DL (ref 8.3–10.3)
CALCIUM BLD-MCNC: 8 MG/DL (ref 8.3–10.3)
CALCIUM BLD-MCNC: 8.1 MG/DL (ref 8.3–10.3)
CALCIUM BLD-MCNC: 8.2 MG/DL (ref 8.3–10.3)
CALCIUM BLD-MCNC: 8.3 MG/DL (ref 8.3–10.3)
CALCIUM BLD-MCNC: 8.5 MG/DL (ref 8.3–10.3)
CALCIUM BLD-MCNC: 8.6 MG/DL (ref 8.3–10.3)
CALCIUM BLD-MCNC: 8.6 MG/DL (ref 8.3–10.3)
CHLORIDE: 100 MMOL/L (ref 101–111)
CHLORIDE: 101 MMOL/L (ref 101–111)
CHLORIDE: 102 MMOL/L (ref 101–111)
CHLORIDE: 103 MMOL/L (ref 101–111)
CHLORIDE: 104 MMOL/L (ref 101–111)
CHLORIDE: 105 MMOL/L (ref 101–111)
CHLORIDE: 92 MMOL/L (ref 101–111)
CHLORIDE: 95 MMOL/L (ref 101–111)
CHLORIDE: 99 MMOL/L (ref 101–111)
CLARITY UR REFRACT.AUTO: CLEAR
CO2: 16 MMOL/L (ref 22–32)
CO2: 22 MMOL/L (ref 22–32)
CO2: 27 MMOL/L (ref 22–32)
CO2: 28 MMOL/L (ref 22–32)
CO2: 29 MMOL/L (ref 22–32)
CO2: 29 MMOL/L (ref 22–32)
CO2: 30 MMOL/L (ref 22–32)
CO2: 31 MMOL/L (ref 22–32)
CO2: 32 MMOL/L (ref 22–32)
CREAT BLD-MCNC: 1.02 MG/DL (ref 0.55–1.02)
CREAT BLD-MCNC: 1.05 MG/DL (ref 0.55–1.02)
CREAT BLD-MCNC: 1.06 MG/DL (ref 0.55–1.02)
CREAT BLD-MCNC: 1.17 MG/DL (ref 0.55–1.02)
CREAT BLD-MCNC: 1.36 MG/DL (ref 0.55–1.02)
CREAT BLD-MCNC: 1.36 MG/DL (ref 0.55–1.02)
CREAT BLD-MCNC: 1.61 MG/DL (ref 0.55–1.02)
CREAT BLD-MCNC: 1.76 MG/DL (ref 0.55–1.02)
CREAT BLD-MCNC: 2.3 MG/DL (ref 0.55–1.02)
CREAT BLD-MCNC: 2.41 MG/DL (ref 0.55–1.02)
CREAT BLD-MCNC: 2.55 MG/DL (ref 0.55–1.02)
CREAT BLD-MCNC: 3.32 MG/DL (ref 0.55–1.02)
CREAT BLD-MCNC: 4.03 MG/DL (ref 0.55–1.02)
CREAT BLD-MCNC: 4.04 MG/DL (ref 0.55–1.02)
CREAT UR-SCNC: 109 MG/DL
CREAT UR-SCNC: 152 MG/DL
CREAT UR-SCNC: 190 MG/DL
CREAT UR-SCNC: 217 MG/DL
CREAT UR-SCNC: 51.8 MG/DL
DAT (C3D): NEGATIVE
DAT (C3D): NEGATIVE
DAT (IGG): NEGATIVE
DAT (IGG): POSITIVE
DEPRECATED HBV CORE AB SER IA-ACNC: 341 NG/ML (ref 10–291)
ELUATE RESULT: NEGATIVE
EOSINOPHIL # BLD AUTO: 0.04 X10(3) UL (ref 0–0.3)
EOSINOPHIL # BLD AUTO: 0.07 X10(3) UL (ref 0–0.3)
EOSINOPHIL # BLD AUTO: 0.09 X10(3) UL (ref 0–0.3)
EOSINOPHIL # BLD AUTO: 0.12 X10(3) UL (ref 0–0.3)
EOSINOPHIL # BLD AUTO: 0.13 X10(3) UL (ref 0–0.3)
EOSINOPHIL # BLD AUTO: 0.14 X10(3) UL (ref 0–0.3)
EOSINOPHIL NFR BLD AUTO: 0.5 %
EOSINOPHIL NFR BLD AUTO: 0.9 %
EOSINOPHIL NFR BLD AUTO: 1 %
EOSINOPHIL NFR BLD AUTO: 1.2 %
EOSINOPHIL NFR BLD AUTO: 1.5 %
EOSINOPHIL NFR BLD AUTO: 1.6 %
EOSINOPHIL NFR BLD AUTO: 1.6 %
EOSINOPHIL NFR BLD AUTO: 1.9 %
ERYTHROCYTE [DISTWIDTH] IN BLOOD BY AUTOMATED COUNT: 18.8 % (ref 11.5–16)
ERYTHROCYTE [DISTWIDTH] IN BLOOD BY AUTOMATED COUNT: 21.1 % (ref 11.5–16)
ERYTHROCYTE [DISTWIDTH] IN BLOOD BY AUTOMATED COUNT: 21.4 % (ref 11.5–16)
ERYTHROCYTE [DISTWIDTH] IN BLOOD BY AUTOMATED COUNT: 21.7 % (ref 11.5–16)
ERYTHROCYTE [DISTWIDTH] IN BLOOD BY AUTOMATED COUNT: 22.5 % (ref 11.5–16)
ERYTHROCYTE [DISTWIDTH] IN BLOOD BY AUTOMATED COUNT: 24.9 % (ref 11.5–16)
ERYTHROCYTE [DISTWIDTH] IN BLOOD BY AUTOMATED COUNT: 25.2 % (ref 11.5–16)
ERYTHROCYTE [DISTWIDTH] IN BLOOD BY AUTOMATED COUNT: 26.6 % (ref 11.5–16)
ERYTHROCYTE [DISTWIDTH] IN BLOOD BY AUTOMATED COUNT: 26.9 % (ref 11.5–16)
FIBRINOGEN: 162 MG/DL (ref 200–446)
FOLATE (FOLIC ACID), SERUM: 13.7 NG/ML (ref 8.7–24)
GLUCOSE BLD-MCNC: 104 MG/DL (ref 70–99)
GLUCOSE BLD-MCNC: 105 MG/DL (ref 70–99)
GLUCOSE BLD-MCNC: 109 MG/DL (ref 70–99)
GLUCOSE BLD-MCNC: 114 MG/DL (ref 70–99)
GLUCOSE BLD-MCNC: 81 MG/DL (ref 70–99)
GLUCOSE BLD-MCNC: 82 MG/DL (ref 70–99)
GLUCOSE BLD-MCNC: 84 MG/DL (ref 70–99)
GLUCOSE BLD-MCNC: 85 MG/DL (ref 70–99)
GLUCOSE BLD-MCNC: 85 MG/DL (ref 70–99)
GLUCOSE BLD-MCNC: 92 MG/DL (ref 70–99)
GLUCOSE BLD-MCNC: 92 MG/DL (ref 70–99)
GLUCOSE BLD-MCNC: 95 MG/DL (ref 70–99)
GLUCOSE BLD-MCNC: 95 MG/DL (ref 70–99)
GLUCOSE UR STRIP.AUTO-MCNC: 50 MG/DL
GLUCOSE UR STRIP.AUTO-MCNC: NEGATIVE MG/DL
HAPTOGLOBIN: <7.8 MG/DL (ref 30–200)
HAPTOGLOBIN: <7.8 MG/DL (ref 30–200)
HAV AB SERPL IA-ACNC: 1618 PG/ML (ref 193–986)
HAV IGM SER QL: 2 MG/DL (ref 1.7–3)
HCT VFR BLD AUTO: 17.4 % (ref 34–50)
HCT VFR BLD AUTO: 17.6 % (ref 34–50)
HCT VFR BLD AUTO: 17.9 % (ref 34–50)
HCT VFR BLD AUTO: 18.7 % (ref 34–50)
HCT VFR BLD AUTO: 18.8 % (ref 34–50)
HCT VFR BLD AUTO: 19.3 % (ref 34–50)
HCT VFR BLD AUTO: 21.7 % (ref 34–50)
HCT VFR BLD AUTO: 22.3 % (ref 34–50)
HCT VFR BLD AUTO: 22.6 % (ref 34–50)
HGB BLD-MCNC: 5.8 G/DL (ref 12–16)
HGB BLD-MCNC: 6 G/DL (ref 12–16)
HGB BLD-MCNC: 6.1 G/DL (ref 12–16)
HGB BLD-MCNC: 6.2 G/DL (ref 12–16)
HGB BLD-MCNC: 6.3 G/DL (ref 12–16)
HGB BLD-MCNC: 6.4 G/DL (ref 12–16)
HGB BLD-MCNC: 6.7 G/DL (ref 12–16)
HGB BLD-MCNC: 6.7 G/DL (ref 12–16)
HGB BLD-MCNC: 7 G/DL (ref 12–16)
HGB BLD-MCNC: 7.2 G/DL (ref 12–16)
HGB BLD-MCNC: 7.3 G/DL (ref 12–16)
HGB BLD-MCNC: 7.4 G/DL (ref 12–16)
HGB BLD-MCNC: 7.7 G/DL (ref 12–16)
HGB BLD-MCNC: 7.8 G/DL (ref 12–16)
HGB BLD-MCNC: 7.9 G/DL (ref 12–16)
HYALINE CASTS #/AREA URNS AUTO: PRESENT /LPF
IMMATURE GRANULOCYTE COUNT: 0.06 X10(3) UL (ref 0–1)
IMMATURE GRANULOCYTE COUNT: 0.08 X10(3) UL (ref 0–1)
IMMATURE GRANULOCYTE COUNT: 0.09 X10(3) UL (ref 0–1)
IMMATURE GRANULOCYTE COUNT: 0.09 X10(3) UL (ref 0–1)
IMMATURE GRANULOCYTE COUNT: 0.1 X10(3) UL (ref 0–1)
IMMATURE GRANULOCYTE COUNT: 0.2 X10(3) UL (ref 0–1)
IMMATURE GRANULOCYTE COUNT: 0.21 X10(3) UL (ref 0–1)
IMMATURE GRANULOCYTE COUNT: 0.22 X10(3) UL (ref 0–1)
IMMATURE GRANULOCYTE RATIO %: 1 %
IMMATURE GRANULOCYTE RATIO %: 1.1 %
IMMATURE GRANULOCYTE RATIO %: 1.3 %
IMMATURE GRANULOCYTE RATIO %: 1.3 %
IMMATURE GRANULOCYTE RATIO %: 1.6 %
IMMATURE GRANULOCYTE RATIO %: 2 %
IMMATURE GRANULOCYTE RATIO %: 2.3 %
IMMATURE GRANULOCYTE RATIO %: 2.3 %
INR BLD: 2.06 (ref 0.89–1.11)
INR BLD: 2.14 (ref 0.89–1.11)
INR BLD: 2.17 (ref 0.89–1.11)
INR BLD: 2.18 (ref 0.89–1.11)
INR BLD: 2.22 (ref 0.89–1.11)
INR BLD: 2.26 (ref 0.89–1.11)
INR BLD: 2.37 (ref 0.89–1.11)
INR BLD: 2.74 (ref 0.89–1.11)
IRON SATURATION: 84 % (ref 13–45)
IRON: 156 UG/DL (ref 28–170)
KETONES UR STRIP.AUTO-MCNC: NEGATIVE MG/DL
KETONES UR STRIP.AUTO-MCNC: NEGATIVE MG/DL
LACTIC ACID: 1.6 MMOL/L (ref 0.5–2)
LACTIC ACID: 1.9 MMOL/L (ref 0.5–2)
LACTIC ACID: 2.2 MMOL/L (ref 0.5–2)
LDH: 261 U/L (ref 84–246)
LDH: 348 U/L (ref 84–246)
LDH: 350 U/L (ref 84–246)
LEUKOCYTE ESTERASE UR QL STRIP.AUTO: NEGATIVE
LEUKOCYTE ESTERASE UR QL STRIP.AUTO: NEGATIVE
LIPASE: 263 U/L (ref 73–393)
LYMPHOCYTES # BLD AUTO: 0.59 X10(3) UL (ref 0.9–4)
LYMPHOCYTES # BLD AUTO: 0.83 X10(3) UL (ref 0.9–4)
LYMPHOCYTES # BLD AUTO: 0.86 X10(3) UL (ref 0.9–4)
LYMPHOCYTES # BLD AUTO: 0.89 X10(3) UL (ref 0.9–4)
LYMPHOCYTES # BLD AUTO: 0.96 X10(3) UL (ref 0.9–4)
LYMPHOCYTES # BLD AUTO: 1.08 X10(3) UL (ref 0.9–4)
LYMPHOCYTES # BLD AUTO: 1.1 X10(3) UL (ref 0.9–4)
LYMPHOCYTES # BLD AUTO: 1.11 X10(3) UL (ref 0.9–4)
LYMPHOCYTES NFR BLD AUTO: 10.4 %
LYMPHOCYTES NFR BLD AUTO: 11.5 %
LYMPHOCYTES NFR BLD AUTO: 16.3 %
LYMPHOCYTES NFR BLD AUTO: 17.6 %
LYMPHOCYTES NFR BLD AUTO: 18.7 %
LYMPHOCYTES NFR BLD AUTO: 8 %
LYMPHOCYTES NFR BLD AUTO: 8.6 %
LYMPHOCYTES NFR BLD AUTO: 9.8 %
M PROTEIN MFR SERPL ELPH: 7.3 G/DL (ref 6.1–8.3)
M PROTEIN MFR SERPL ELPH: 7.5 G/DL (ref 6.1–8.3)
M PROTEIN MFR SERPL ELPH: 7.8 G/DL (ref 6.1–8.3)
M PROTEIN MFR SERPL ELPH: 7.9 G/DL (ref 6.1–8.3)
M PROTEIN MFR SERPL ELPH: 8.3 G/DL (ref 6.1–8.3)
M PROTEIN MFR SERPL ELPH: 8.7 G/DL (ref 6.1–8.3)
MCH RBC QN AUTO: 32.4 PG (ref 27–33.2)
MCH RBC QN AUTO: 32.8 PG (ref 27–33.2)
MCH RBC QN AUTO: 32.8 PG (ref 27–33.2)
MCH RBC QN AUTO: 33.8 PG (ref 27–33.2)
MCH RBC QN AUTO: 33.9 PG (ref 27–33.2)
MCH RBC QN AUTO: 34.4 PG (ref 27–33.2)
MCH RBC QN AUTO: 34.9 PG (ref 27–33.2)
MCH RBC QN AUTO: 35.5 PG (ref 27–33.2)
MCH RBC QN AUTO: 36.6 PG (ref 27–33.2)
MCHC RBC AUTO-ENTMCNC: 33.3 G/DL (ref 31–37)
MCHC RBC AUTO-ENTMCNC: 33.5 G/DL (ref 31–37)
MCHC RBC AUTO-ENTMCNC: 33.6 G/DL (ref 31–37)
MCHC RBC AUTO-ENTMCNC: 34.1 G/DL (ref 31–37)
MCHC RBC AUTO-ENTMCNC: 34.1 G/DL (ref 31–37)
MCHC RBC AUTO-ENTMCNC: 34.2 G/DL (ref 31–37)
MCHC RBC AUTO-ENTMCNC: 34.5 G/DL (ref 31–37)
MCHC RBC AUTO-ENTMCNC: 34.5 G/DL (ref 31–37)
MCHC RBC AUTO-ENTMCNC: 34.7 G/DL (ref 31–37)
MCV RBC AUTO: 102.7 FL (ref 81–100)
MCV RBC AUTO: 104.1 FL (ref 81–100)
MCV RBC AUTO: 104.8 FL (ref 81–100)
MCV RBC AUTO: 107.3 FL (ref 81–100)
MCV RBC AUTO: 94.9 FL (ref 81–100)
MCV RBC AUTO: 95 FL (ref 81–100)
MCV RBC AUTO: 96.4 FL (ref 81–100)
MCV RBC AUTO: 97.5 FL (ref 81–100)
MCV RBC AUTO: 98.9 FL (ref 81–100)
MONOCYTES # BLD AUTO: 0.44 X10(3) UL (ref 0.1–0.6)
MONOCYTES # BLD AUTO: 0.46 X10(3) UL (ref 0.1–0.6)
MONOCYTES # BLD AUTO: 0.51 X10(3) UL (ref 0.1–0.6)
MONOCYTES # BLD AUTO: 0.53 X10(3) UL (ref 0.1–0.6)
MONOCYTES # BLD AUTO: 0.63 X10(3) UL (ref 0.1–0.6)
MONOCYTES # BLD AUTO: 0.65 X10(3) UL (ref 0.1–0.6)
MONOCYTES # BLD AUTO: 0.8 X10(3) UL (ref 0.1–0.6)
MONOCYTES # BLD AUTO: 0.86 X10(3) UL (ref 0.1–0.6)
MONOCYTES NFR BLD AUTO: 5.8 %
MONOCYTES NFR BLD AUTO: 5.9 %
MONOCYTES NFR BLD AUTO: 7.2 %
MONOCYTES NFR BLD AUTO: 8.4 %
MONOCYTES NFR BLD AUTO: 8.6 %
MONOCYTES NFR BLD AUTO: 8.8 %
MONOCYTES NFR BLD AUTO: 9.3 %
MONOCYTES NFR BLD AUTO: 9.7 %
NEUTROPHIL ABS PRELIM: 3.76 X10 (3) UL (ref 1.3–6.7)
NEUTROPHIL ABS PRELIM: 4.01 X10 (3) UL (ref 1.3–6.7)
NEUTROPHIL ABS PRELIM: 4.83 X10 (3) UL (ref 1.3–6.7)
NEUTROPHIL ABS PRELIM: 6.21 X10 (3) UL (ref 1.3–6.7)
NEUTROPHIL ABS PRELIM: 6.51 X10 (3) UL (ref 1.3–6.7)
NEUTROPHIL ABS PRELIM: 7.17 X10 (3) UL (ref 1.3–6.7)
NEUTROPHIL ABS PRELIM: 7.24 X10 (3) UL (ref 1.3–6.7)
NEUTROPHIL ABS PRELIM: 7.95 X10 (3) UL (ref 1.3–6.7)
NEUTROPHILS # BLD AUTO: 3.76 X10(3) UL (ref 1.3–6.7)
NEUTROPHILS # BLD AUTO: 4.01 X10(3) UL (ref 1.3–6.7)
NEUTROPHILS # BLD AUTO: 4.83 X10(3) UL (ref 1.3–6.7)
NEUTROPHILS # BLD AUTO: 6.21 X10(3) UL (ref 1.3–6.7)
NEUTROPHILS # BLD AUTO: 6.51 X10(3) UL (ref 1.3–6.7)
NEUTROPHILS # BLD AUTO: 7.17 X10(3) UL (ref 1.3–6.7)
NEUTROPHILS # BLD AUTO: 7.24 X10(3) UL (ref 1.3–6.7)
NEUTROPHILS # BLD AUTO: 7.95 X10(3) UL (ref 1.3–6.7)
NEUTROPHILS NFR BLD AUTO: 69 %
NEUTROPHILS NFR BLD AUTO: 69.4 %
NEUTROPHILS NFR BLD AUTO: 70.9 %
NEUTROPHILS NFR BLD AUTO: 75.8 %
NEUTROPHILS NFR BLD AUTO: 79 %
NEUTROPHILS NFR BLD AUTO: 79.4 %
NEUTROPHILS NFR BLD AUTO: 81.9 %
NEUTROPHILS NFR BLD AUTO: 84 %
NITRITE UR QL STRIP.AUTO: NEGATIVE
NITRITE UR QL STRIP.AUTO: NEGATIVE
OSMOLALITY URINE: 423 MOSM/KG (ref 300–1300)
PH UR STRIP.AUTO: 5 [PH] (ref 4.5–8)
PH UR STRIP.AUTO: 6 [PH] (ref 4.5–8)
PHOSPHATE SERPL-MCNC: 5.8 MG/DL (ref 2.5–4.9)
PHOSPHATE SERPL-MCNC: 6.1 MG/DL (ref 2.5–4.9)
PLATELET # BLD AUTO: 56 10(3)UL (ref 150–450)
PLATELET # BLD AUTO: 58 10(3)UL (ref 150–450)
PLATELET # BLD AUTO: 60 10(3)UL (ref 150–450)
PLATELET # BLD AUTO: 63 10(3)UL (ref 150–450)
PLATELET # BLD AUTO: 64 10(3)UL (ref 150–450)
PLATELET # BLD AUTO: 66 10(3)UL (ref 150–450)
PLATELET # BLD AUTO: 66 10(3)UL (ref 150–450)
PLATELET # BLD AUTO: 74 10(3)UL (ref 150–450)
PLATELET # BLD AUTO: 75 10(3)UL (ref 150–450)
PLATELET MORPHOLOGY: NORMAL
PLATELET MORPHOLOGY: NORMAL
POCT LOT NUMBER: NORMAL
POCT LOT NUMBER: NORMAL
POCT URINE PREGNANCY: NEGATIVE
POCT URINE PREGNANCY: NEGATIVE
POTASSIUM SERPL-SCNC: 3 MMOL/L (ref 3.6–5.1)
POTASSIUM SERPL-SCNC: 3 MMOL/L (ref 3.6–5.1)
POTASSIUM SERPL-SCNC: 3.3 MMOL/L (ref 3.6–5.1)
POTASSIUM SERPL-SCNC: 3.4 MMOL/L (ref 3.6–5.1)
POTASSIUM SERPL-SCNC: 3.4 MMOL/L (ref 3.6–5.1)
POTASSIUM SERPL-SCNC: 3.5 MMOL/L (ref 3.6–5.1)
POTASSIUM SERPL-SCNC: 3.6 MMOL/L (ref 3.6–5.1)
POTASSIUM SERPL-SCNC: 3.6 MMOL/L (ref 3.6–5.1)
POTASSIUM SERPL-SCNC: 3.9 MMOL/L (ref 3.6–5.1)
POTASSIUM SERPL-SCNC: 4.3 MMOL/L (ref 3.6–5.1)
POTASSIUM SERPL-SCNC: 4.3 MMOL/L (ref 3.6–5.1)
POTASSIUM SERPL-SCNC: 4.4 MMOL/L (ref 3.6–5.1)
POTASSIUM SERPL-SCNC: 4.5 MMOL/L (ref 3.6–5.1)
PROCALCITONIN SERPL-MCNC: 0.66 NG/ML (ref ?–0.11)
PROT UR STRIP.AUTO-MCNC: 30 MG/DL
PROT UR STRIP.AUTO-MCNC: NEGATIVE MG/DL
PSA SERPL DL<=0.01 NG/ML-MCNC: 23.5 SECONDS (ref 12–14.3)
PSA SERPL DL<=0.01 NG/ML-MCNC: 24.3 SECONDS (ref 12–14.3)
PSA SERPL DL<=0.01 NG/ML-MCNC: 24.5 SECONDS (ref 12–14.3)
PSA SERPL DL<=0.01 NG/ML-MCNC: 24.6 SECONDS (ref 12–14.3)
PSA SERPL DL<=0.01 NG/ML-MCNC: 25 SECONDS (ref 12–14.3)
PSA SERPL DL<=0.01 NG/ML-MCNC: 25.3 SECONDS (ref 12–14.3)
PSA SERPL DL<=0.01 NG/ML-MCNC: 26.3 SECONDS (ref 12–14.3)
PSA SERPL DL<=0.01 NG/ML-MCNC: 29.6 SECONDS (ref 12–14.3)
RBC # BLD AUTO: 1.64 X10(6)UL (ref 3.8–5.1)
RBC # BLD AUTO: 1.66 X10(6)UL (ref 3.8–5.1)
RBC # BLD AUTO: 1.72 X10(6)UL (ref 3.8–5.1)
RBC # BLD AUTO: 1.83 X10(6)UL (ref 3.8–5.1)
RBC # BLD AUTO: 1.89 X10(6)UL (ref 3.8–5.1)
RBC # BLD AUTO: 1.98 X10(6)UL (ref 3.8–5.1)
RBC # BLD AUTO: 2.25 X10(6)UL (ref 3.8–5.1)
RBC # BLD AUTO: 2.35 X10(6)UL (ref 3.8–5.1)
RBC # BLD AUTO: 2.38 X10(6)UL (ref 3.8–5.1)
RED CELL DISTRIBUTION WIDTH-SD: 67.9 FL (ref 35.1–46.3)
RED CELL DISTRIBUTION WIDTH-SD: 68.7 FL (ref 35.1–46.3)
RED CELL DISTRIBUTION WIDTH-SD: 68.8 FL (ref 35.1–46.3)
RED CELL DISTRIBUTION WIDTH-SD: 70.2 FL (ref 35.1–46.3)
RED CELL DISTRIBUTION WIDTH-SD: 74.2 FL (ref 35.1–46.3)
RED CELL DISTRIBUTION WIDTH-SD: 78.3 FL (ref 35.1–46.3)
RED CELL DISTRIBUTION WIDTH-SD: 81.3 FL (ref 35.1–46.3)
RED CELL DISTRIBUTION WIDTH-SD: 81.5 FL (ref 35.1–46.3)
RED CELL DISTRIBUTION WIDTH-SD: 88.3 FL (ref 35.1–46.3)
RETIC ABS CALC AUTO: 165.4 X10(3) UL (ref 22.5–147.5)
RETIC ABS CALC AUTO: 198.7 X10(3) UL (ref 22.5–147.5)
RETIC IRF CALC: 0.31 RATIO (ref 0.09–0.3)
RETIC IRF CALC: 0.34 RATIO (ref 0.09–0.3)
RETIC%: 10.9 % (ref 0.5–2.5)
RETIC%: 8.2 % (ref 0.5–2.5)
RETICULOCYTE HEMOGLOBIN EQUIVALENT: 41.7 PG (ref 28.2–36.3)
RETICULOCYTE HEMOGLOBIN EQUIVALENT: 42.4 PG (ref 28.2–36.3)
RH BLOOD TYPE: NEGATIVE
SODIUM SERPL-SCNC: 11 MMOL/L
SODIUM SERPL-SCNC: 13 MMOL/L
SODIUM SERPL-SCNC: 131 MMOL/L (ref 136–144)
SODIUM SERPL-SCNC: 132 MMOL/L (ref 136–144)
SODIUM SERPL-SCNC: 132 MMOL/L (ref 136–144)
SODIUM SERPL-SCNC: 134 MMOL/L (ref 136–144)
SODIUM SERPL-SCNC: 135 MMOL/L (ref 136–144)
SODIUM SERPL-SCNC: 136 MMOL/L (ref 136–144)
SODIUM SERPL-SCNC: 137 MMOL/L (ref 136–144)
SODIUM SERPL-SCNC: 138 MMOL/L (ref 136–144)
SODIUM SERPL-SCNC: 139 MMOL/L (ref 136–144)
SODIUM SERPL-SCNC: 18 MMOL/L
SODIUM SERPL-SCNC: 26 MMOL/L
SODIUM SERPL-SCNC: 74 MMOL/L
SP GR UR STRIP.AUTO: 1.01 (ref 1–1.03)
SP GR UR STRIP.AUTO: 1.02 (ref 1–1.03)
TOTAL IRON BINDING CAPACITY: 185 UG/DL (ref 298–536)
TRANSFERRIN: 124 MG/DL (ref 200–360)
UREA UR RANDOM: 324 MG/DL
UREA UR RANDOM: 514 MG/DL
UREA UR RANDOM: 672 MG/DL
UROBILINOGEN UR STRIP.AUTO-MCNC: 4 MG/DL
UROBILINOGEN UR STRIP.AUTO-MCNC: 4 MG/DL
WBC # BLD AUTO: 10.1 X10(3) UL (ref 4–13)
WBC # BLD AUTO: 5.5 X10(3) UL (ref 4–13)
WBC # BLD AUTO: 5.8 X10(3) UL (ref 4–13)
WBC # BLD AUTO: 6.8 X10(3) UL (ref 4–13)
WBC # BLD AUTO: 7.4 X10(3) UL (ref 4–13)
WBC # BLD AUTO: 8 X10(3) UL (ref 4–13)
WBC # BLD AUTO: 8.8 X10(3) UL (ref 4–13)
WBC # BLD AUTO: 9 X10(3) UL (ref 4–13)
WBC # BLD AUTO: 9.6 X10(3) UL (ref 4–13)

## 2018-01-01 PROCEDURE — C9113 INJ PANTOPRAZOLE SODIUM, VIA: HCPCS | Performed by: INTERNAL MEDICINE

## 2018-01-01 PROCEDURE — 36430 TRANSFUSION BLD/BLD COMPNT: CPT

## 2018-01-01 PROCEDURE — 96361 HYDRATE IV INFUSION ADD-ON: CPT

## 2018-01-01 PROCEDURE — 84300 ASSAY OF URINE SODIUM: CPT | Performed by: INTERNAL MEDICINE

## 2018-01-01 PROCEDURE — 80053 COMPREHEN METABOLIC PANEL: CPT | Performed by: INTERNAL MEDICINE

## 2018-01-01 PROCEDURE — 86901 BLOOD TYPING SEROLOGIC RH(D): CPT | Performed by: EMERGENCY MEDICINE

## 2018-01-01 PROCEDURE — 85045 AUTOMATED RETICULOCYTE COUNT: CPT | Performed by: INTERNAL MEDICINE

## 2018-01-01 PROCEDURE — 86850 RBC ANTIBODY SCREEN: CPT | Performed by: EMERGENCY MEDICINE

## 2018-01-01 PROCEDURE — 85730 THROMBOPLASTIN TIME PARTIAL: CPT | Performed by: EMERGENCY MEDICINE

## 2018-01-01 PROCEDURE — C9113 INJ PANTOPRAZOLE SODIUM, VIA: HCPCS | Performed by: NURSE PRACTITIONER

## 2018-01-01 PROCEDURE — 86850 RBC ANTIBODY SCREEN: CPT | Performed by: INTERNAL MEDICINE

## 2018-01-01 PROCEDURE — 85025 COMPLETE CBC W/AUTO DIFF WBC: CPT | Performed by: NURSE PRACTITIONER

## 2018-01-01 PROCEDURE — 84540 ASSAY OF URINE/UREA-N: CPT | Performed by: INTERNAL MEDICINE

## 2018-01-01 PROCEDURE — 83615 LACTATE (LD) (LDH) ENZYME: CPT | Performed by: EMERGENCY MEDICINE

## 2018-01-01 PROCEDURE — P9047 ALBUMIN (HUMAN), 25%, 50ML: HCPCS | Performed by: INTERNAL MEDICINE

## 2018-01-01 PROCEDURE — 87493 C DIFF AMPLIFIED PROBE: CPT | Performed by: INTERNAL MEDICINE

## 2018-01-01 PROCEDURE — 86920 COMPATIBILITY TEST SPIN: CPT

## 2018-01-01 PROCEDURE — 96365 THER/PROPH/DIAG IV INF INIT: CPT

## 2018-01-01 PROCEDURE — 30233N1 TRANSFUSION OF NONAUTOLOGOUS RED BLOOD CELLS INTO PERIPHERAL VEIN, PERCUTANEOUS APPROACH: ICD-10-PCS | Performed by: HOSPITALIST

## 2018-01-01 PROCEDURE — 85018 HEMOGLOBIN: CPT | Performed by: INTERNAL MEDICINE

## 2018-01-01 PROCEDURE — 85025 COMPLETE CBC W/AUTO DIFF WBC: CPT | Performed by: INTERNAL MEDICINE

## 2018-01-01 PROCEDURE — 99291 CRITICAL CARE FIRST HOUR: CPT

## 2018-01-01 PROCEDURE — C9113 INJ PANTOPRAZOLE SODIUM, VIA: HCPCS | Performed by: EMERGENCY MEDICINE

## 2018-01-01 PROCEDURE — 99285 EMERGENCY DEPT VISIT HI MDM: CPT

## 2018-01-01 PROCEDURE — 76705 ECHO EXAM OF ABDOMEN: CPT | Performed by: NURSE PRACTITIONER

## 2018-01-01 PROCEDURE — 83735 ASSAY OF MAGNESIUM: CPT | Performed by: INTERNAL MEDICINE

## 2018-01-01 PROCEDURE — 87081 CULTURE SCREEN ONLY: CPT | Performed by: NURSE PRACTITIONER

## 2018-01-01 PROCEDURE — 93975 VASCULAR STUDY: CPT | Performed by: NURSE PRACTITIONER

## 2018-01-01 PROCEDURE — 81001 URINALYSIS AUTO W/SCOPE: CPT | Performed by: EMERGENCY MEDICINE

## 2018-01-01 PROCEDURE — 86901 BLOOD TYPING SEROLOGIC RH(D): CPT | Performed by: INTERNAL MEDICINE

## 2018-01-01 PROCEDURE — 82570 ASSAY OF URINE CREATININE: CPT | Performed by: INTERNAL MEDICINE

## 2018-01-01 PROCEDURE — 96375 TX/PRO/DX INJ NEW DRUG ADDON: CPT

## 2018-01-01 PROCEDURE — 87086 URINE CULTURE/COLONY COUNT: CPT | Performed by: EMERGENCY MEDICINE

## 2018-01-01 PROCEDURE — 83935 ASSAY OF URINE OSMOLALITY: CPT | Performed by: INTERNAL MEDICINE

## 2018-01-01 PROCEDURE — 76937 US GUIDE VASCULAR ACCESS: CPT

## 2018-01-01 PROCEDURE — 86900 BLOOD TYPING SEROLOGIC ABO: CPT | Performed by: EMERGENCY MEDICINE

## 2018-01-01 PROCEDURE — 85025 COMPLETE CBC W/AUTO DIFF WBC: CPT | Performed by: HOSPITALIST

## 2018-01-01 PROCEDURE — 82140 ASSAY OF AMMONIA: CPT | Performed by: EMERGENCY MEDICINE

## 2018-01-01 PROCEDURE — 83615 LACTATE (LD) (LDH) ENZYME: CPT | Performed by: INTERNAL MEDICINE

## 2018-01-01 PROCEDURE — 80053 COMPREHEN METABOLIC PANEL: CPT | Performed by: EMERGENCY MEDICINE

## 2018-01-01 PROCEDURE — 85610 PROTHROMBIN TIME: CPT | Performed by: EMERGENCY MEDICINE

## 2018-01-01 PROCEDURE — 93975 VASCULAR STUDY: CPT | Performed by: EMERGENCY MEDICINE

## 2018-01-01 PROCEDURE — 86900 BLOOD TYPING SEROLOGIC ABO: CPT | Performed by: INTERNAL MEDICINE

## 2018-01-01 PROCEDURE — 84132 ASSAY OF SERUM POTASSIUM: CPT | Performed by: HOSPITALIST

## 2018-01-01 PROCEDURE — 74176 CT ABD & PELVIS W/O CONTRAST: CPT | Performed by: INTERNAL MEDICINE

## 2018-01-01 PROCEDURE — 84132 ASSAY OF SERUM POTASSIUM: CPT | Performed by: INTERNAL MEDICINE

## 2018-01-01 PROCEDURE — 85025 COMPLETE CBC W/AUTO DIFF WBC: CPT | Performed by: EMERGENCY MEDICINE

## 2018-01-01 PROCEDURE — 81025 URINE PREGNANCY TEST: CPT

## 2018-01-01 PROCEDURE — 71045 X-RAY EXAM CHEST 1 VIEW: CPT | Performed by: NURSE PRACTITIONER

## 2018-01-01 PROCEDURE — 36410 VNPNXR 3YR/> PHY/QHP DX/THER: CPT

## 2018-01-01 PROCEDURE — 82248 BILIRUBIN DIRECT: CPT | Performed by: INTERNAL MEDICINE

## 2018-01-01 PROCEDURE — 86880 COOMBS TEST DIRECT: CPT | Performed by: INTERNAL MEDICINE

## 2018-01-01 PROCEDURE — 80053 COMPREHEN METABOLIC PANEL: CPT | Performed by: HOSPITALIST

## 2018-01-01 PROCEDURE — 87040 BLOOD CULTURE FOR BACTERIA: CPT | Performed by: INTERNAL MEDICINE

## 2018-01-01 PROCEDURE — 71045 X-RAY EXAM CHEST 1 VIEW: CPT | Performed by: INTERNAL MEDICINE

## 2018-01-01 PROCEDURE — 83010 ASSAY OF HAPTOGLOBIN QUANT: CPT | Performed by: INTERNAL MEDICINE

## 2018-01-01 PROCEDURE — 85610 PROTHROMBIN TIME: CPT | Performed by: INTERNAL MEDICINE

## 2018-01-01 PROCEDURE — 87040 BLOOD CULTURE FOR BACTERIA: CPT | Performed by: EMERGENCY MEDICINE

## 2018-01-01 PROCEDURE — 87077 CULTURE AEROBIC IDENTIFY: CPT | Performed by: EMERGENCY MEDICINE

## 2018-01-01 PROCEDURE — 82728 ASSAY OF FERRITIN: CPT | Performed by: INTERNAL MEDICINE

## 2018-01-01 PROCEDURE — 36415 COLL VENOUS BLD VENIPUNCTURE: CPT

## 2018-01-01 PROCEDURE — 85384 FIBRINOGEN ACTIVITY: CPT | Performed by: EMERGENCY MEDICINE

## 2018-01-01 PROCEDURE — 51702 INSERT TEMP BLADDER CATH: CPT

## 2018-01-01 PROCEDURE — 87150 DNA/RNA AMPLIFIED PROBE: CPT | Performed by: EMERGENCY MEDICINE

## 2018-01-01 PROCEDURE — 84145 PROCALCITONIN (PCT): CPT | Performed by: EMERGENCY MEDICINE

## 2018-01-01 PROCEDURE — 82746 ASSAY OF FOLIC ACID SERUM: CPT | Performed by: INTERNAL MEDICINE

## 2018-01-01 PROCEDURE — 84100 ASSAY OF PHOSPHORUS: CPT | Performed by: INTERNAL MEDICINE

## 2018-01-01 PROCEDURE — 85610 PROTHROMBIN TIME: CPT | Performed by: NURSE PRACTITIONER

## 2018-01-01 PROCEDURE — 83540 ASSAY OF IRON: CPT | Performed by: INTERNAL MEDICINE

## 2018-01-01 PROCEDURE — 82248 BILIRUBIN DIRECT: CPT | Performed by: EMERGENCY MEDICINE

## 2018-01-01 PROCEDURE — 85027 COMPLETE CBC AUTOMATED: CPT | Performed by: NURSE PRACTITIONER

## 2018-01-01 PROCEDURE — 80048 BASIC METABOLIC PNL TOTAL CA: CPT | Performed by: INTERNAL MEDICINE

## 2018-01-01 PROCEDURE — 96374 THER/PROPH/DIAG INJ IV PUSH: CPT

## 2018-01-01 PROCEDURE — 71046 X-RAY EXAM CHEST 2 VIEWS: CPT | Performed by: EMERGENCY MEDICINE

## 2018-01-01 PROCEDURE — 83690 ASSAY OF LIPASE: CPT | Performed by: EMERGENCY MEDICINE

## 2018-01-01 PROCEDURE — 80053 COMPREHEN METABOLIC PANEL: CPT | Performed by: NURSE PRACTITIONER

## 2018-01-01 PROCEDURE — 86860 RBC ANTIBODY ELUTION: CPT | Performed by: INTERNAL MEDICINE

## 2018-01-01 PROCEDURE — 85610 PROTHROMBIN TIME: CPT | Performed by: HOSPITALIST

## 2018-01-01 PROCEDURE — 83550 IRON BINDING TEST: CPT | Performed by: INTERNAL MEDICINE

## 2018-01-01 PROCEDURE — 82140 ASSAY OF AMMONIA: CPT

## 2018-01-01 PROCEDURE — 83605 ASSAY OF LACTIC ACID: CPT | Performed by: EMERGENCY MEDICINE

## 2018-01-01 PROCEDURE — 80069 RENAL FUNCTION PANEL: CPT | Performed by: INTERNAL MEDICINE

## 2018-01-01 PROCEDURE — 82607 VITAMIN B-12: CPT | Performed by: INTERNAL MEDICINE

## 2018-01-01 PROCEDURE — 82565 ASSAY OF CREATININE: CPT | Performed by: NURSE PRACTITIONER

## 2018-01-01 RX ORDER — LACTULOSE 10 G/15ML
20 SOLUTION ORAL 3 TIMES DAILY
Status: DISCONTINUED | OUTPATIENT
Start: 2018-01-01 | End: 2018-01-01

## 2018-01-01 RX ORDER — LORAZEPAM 2 MG/ML
0.5 INJECTION INTRAMUSCULAR ONCE
Status: COMPLETED | OUTPATIENT
Start: 2018-01-01 | End: 2018-01-01

## 2018-01-01 RX ORDER — POTASSIUM CHLORIDE 20 MEQ/1
40 TABLET, EXTENDED RELEASE ORAL EVERY 4 HOURS
Status: DISCONTINUED | OUTPATIENT
Start: 2018-01-01 | End: 2018-01-01

## 2018-01-01 RX ORDER — DIPHENHYDRAMINE HCL 50 MG
50 CAPSULE ORAL NIGHTLY PRN
Status: DISCONTINUED | OUTPATIENT
Start: 2018-01-01 | End: 2018-01-01

## 2018-01-01 RX ORDER — MELATONIN
325 DAILY
Status: DISCONTINUED | OUTPATIENT
Start: 2018-01-01 | End: 2018-01-01

## 2018-01-01 RX ORDER — OCTREOTIDE ACETATE 50 UG/ML
100 INJECTION, SOLUTION INTRAVENOUS; SUBCUTANEOUS EVERY 8 HOURS
Status: DISCONTINUED | OUTPATIENT
Start: 2018-01-01 | End: 2018-01-01

## 2018-01-01 RX ORDER — ALBUMIN (HUMAN) 12.5 G/50ML
25 SOLUTION INTRAVENOUS EVERY 8 HOURS
Status: COMPLETED | OUTPATIENT
Start: 2018-01-01 | End: 2018-01-01

## 2018-01-01 RX ORDER — ESCITALOPRAM OXALATE 10 MG/1
5 TABLET ORAL DAILY
Status: DISCONTINUED | OUTPATIENT
Start: 2018-01-01 | End: 2018-01-01

## 2018-01-01 RX ORDER — CALCIUM ACETATE 667 MG/1
1 CAPSULE ORAL
Qty: 30 CAPSULE | Refills: 0 | Status: SHIPPED | COMMUNITY
Start: 2018-01-01

## 2018-01-01 RX ORDER — SODIUM CHLORIDE 9 MG/ML
INJECTION, SOLUTION INTRAVENOUS CONTINUOUS
Status: DISCONTINUED | OUTPATIENT
Start: 2018-01-01 | End: 2018-01-01

## 2018-01-01 RX ORDER — MIDODRINE HYDROCHLORIDE 2.5 MG/1
2.5 TABLET ORAL 3 TIMES DAILY
Status: DISCONTINUED | OUTPATIENT
Start: 2018-01-01 | End: 2018-01-01

## 2018-01-01 RX ORDER — ACETAMINOPHEN 325 MG/1
650 TABLET ORAL EVERY 8 HOURS PRN
Status: DISCONTINUED | OUTPATIENT
Start: 2018-01-01 | End: 2018-01-01

## 2018-01-01 RX ORDER — TRISODIUM CITRATE DIHYDRATE AND CITRIC ACID MONOHYDRATE 500; 334 MG/5ML; MG/5ML
30 SOLUTION ORAL 2 TIMES DAILY
Qty: 1800 ML | Refills: 0 | Status: SHIPPED | COMMUNITY
Start: 2018-01-01

## 2018-01-01 RX ORDER — PROPRANOLOL HYDROCHLORIDE 10 MG/1
10 TABLET ORAL 3 TIMES DAILY
Status: DISCONTINUED | OUTPATIENT
Start: 2018-01-01 | End: 2018-01-01

## 2018-01-01 RX ORDER — SODIUM CHLORIDE 9 MG/ML
INJECTION, SOLUTION INTRAVENOUS CONTINUOUS
Status: ACTIVE | OUTPATIENT
Start: 2018-01-01 | End: 2018-01-01

## 2018-01-01 RX ORDER — SODIUM CHLORIDE 9 MG/ML
INJECTION, SOLUTION INTRAVENOUS ONCE
Status: COMPLETED | OUTPATIENT
Start: 2018-01-01 | End: 2018-01-01

## 2018-01-01 RX ORDER — FUROSEMIDE 20 MG/1
20 TABLET ORAL ONCE
Status: COMPLETED | OUTPATIENT
Start: 2018-01-01 | End: 2018-01-01

## 2018-01-01 RX ORDER — TRISODIUM CITRATE DIHYDRATE AND CITRIC ACID MONOHYDRATE 500; 334 MG/5ML; MG/5ML
30 SOLUTION ORAL 2 TIMES DAILY
Status: DISCONTINUED | OUTPATIENT
Start: 2018-01-01 | End: 2018-01-01

## 2018-01-01 RX ORDER — ONDANSETRON 2 MG/ML
4 INJECTION INTRAMUSCULAR; INTRAVENOUS ONCE
Status: COMPLETED | OUTPATIENT
Start: 2018-01-01 | End: 2018-01-01

## 2018-01-01 RX ORDER — DIPHENHYDRAMINE HYDROCHLORIDE 50 MG/ML
25 INJECTION INTRAMUSCULAR; INTRAVENOUS ONCE
Status: COMPLETED | OUTPATIENT
Start: 2018-01-01 | End: 2018-01-01

## 2018-01-01 RX ORDER — MIDODRINE HYDROCHLORIDE 2.5 MG/1
2.5 TABLET ORAL 3 TIMES DAILY
COMMUNITY
End: 2018-01-01

## 2018-01-01 RX ORDER — SODIUM CHLORIDE 9 MG/ML
1000 INJECTION, SOLUTION INTRAVENOUS ONCE
Status: COMPLETED | OUTPATIENT
Start: 2018-01-01 | End: 2018-01-01

## 2018-01-01 RX ORDER — PROPRANOLOL HYDROCHLORIDE 10 MG/1
10 TABLET ORAL 3 TIMES DAILY
COMMUNITY

## 2018-01-01 RX ORDER — LORAZEPAM 2 MG/ML
1 INJECTION INTRAMUSCULAR ONCE
Status: COMPLETED | OUTPATIENT
Start: 2018-01-01 | End: 2018-01-01

## 2018-01-01 RX ORDER — OCTREOTIDE ACETATE 50 UG/ML
150 INJECTION, SOLUTION INTRAVENOUS; SUBCUTANEOUS EVERY 8 HOURS
Status: COMPLETED | OUTPATIENT
Start: 2018-01-01 | End: 2018-01-01

## 2018-01-01 RX ORDER — MIDODRINE HYDROCHLORIDE 10 MG/1
10 TABLET ORAL 3 TIMES DAILY
Status: DISCONTINUED | OUTPATIENT
Start: 2018-01-01 | End: 2018-01-01

## 2018-01-01 RX ORDER — FUROSEMIDE 10 MG/ML
20 INJECTION INTRAMUSCULAR; INTRAVENOUS DAILY
Status: DISCONTINUED | OUTPATIENT
Start: 2018-01-01 | End: 2018-01-01

## 2018-01-01 RX ORDER — DIPHENHYDRAMINE HCL 25 MG
25 CAPSULE ORAL NIGHTLY PRN
Status: DISCONTINUED | OUTPATIENT
Start: 2018-01-01 | End: 2018-01-01

## 2018-01-01 RX ORDER — LACTULOSE 10 G/15ML
20 SOLUTION ORAL 3 TIMES DAILY
Qty: 1 BOTTLE | Refills: 0 | Status: SHIPPED | COMMUNITY
Start: 2018-01-01

## 2018-01-01 RX ORDER — FUROSEMIDE 20 MG/1
20 TABLET ORAL DAILY
Status: DISCONTINUED | OUTPATIENT
Start: 2018-01-01 | End: 2018-01-01

## 2018-01-01 RX ORDER — ONDANSETRON 2 MG/ML
4 INJECTION INTRAMUSCULAR; INTRAVENOUS EVERY 6 HOURS PRN
Status: DISCONTINUED | OUTPATIENT
Start: 2018-01-01 | End: 2018-01-01

## 2018-01-01 RX ORDER — CALCIUM ACETATE 667 MG/1
1 CAPSULE ORAL
Status: DISCONTINUED | OUTPATIENT
Start: 2018-01-01 | End: 2018-01-01

## 2018-01-01 RX ORDER — MIDODRINE HYDROCHLORIDE 5 MG/1
5 TABLET ORAL 3 TIMES DAILY
Status: DISCONTINUED | OUTPATIENT
Start: 2018-01-01 | End: 2018-01-01

## 2018-01-01 RX ORDER — DIPHENHYDRAMINE HCL 25 MG
25 CAPSULE ORAL EVERY 6 HOURS PRN
Status: DISCONTINUED | OUTPATIENT
Start: 2018-01-01 | End: 2018-01-01

## 2018-01-01 RX ORDER — MIDODRINE HYDROCHLORIDE 10 MG/1
10 TABLET ORAL 3 TIMES DAILY
Qty: 90 TABLET | Refills: 0 | Status: SHIPPED | COMMUNITY
Start: 2018-01-01

## 2018-01-01 RX ORDER — PANTOPRAZOLE SODIUM 40 MG/1
40 TABLET, DELAYED RELEASE ORAL
Status: DISCONTINUED | OUTPATIENT
Start: 2018-01-01 | End: 2018-01-01

## 2018-01-01 RX ORDER — ESCITALOPRAM OXALATE 5 MG/1
5 TABLET ORAL DAILY
Status: DISCONTINUED | OUTPATIENT
Start: 2018-01-01 | End: 2018-01-01

## 2018-01-01 RX ORDER — SPIRONOLACTONE 25 MG/1
25 TABLET ORAL DAILY
Status: DISCONTINUED | OUTPATIENT
Start: 2018-01-01 | End: 2018-01-01

## 2018-01-01 RX ORDER — POTASSIUM CHLORIDE 20 MEQ/1
40 TABLET, EXTENDED RELEASE ORAL 2 TIMES DAILY
Status: COMPLETED | OUTPATIENT
Start: 2018-01-01 | End: 2018-01-01

## 2018-01-01 RX ORDER — TRAMADOL HYDROCHLORIDE 50 MG/1
50 TABLET ORAL EVERY 6 HOURS PRN
Status: DISCONTINUED | OUTPATIENT
Start: 2018-01-01 | End: 2018-01-01

## 2018-01-05 PROBLEM — K72.10 CHRONIC LIVER FAILURE WITHOUT HEPATIC COMA (HCC): Status: ACTIVE | Noted: 2018-01-01

## 2018-01-05 NOTE — H&P
[facility] Hospitalist H&P       CC: Patient presents with:  Abnormal Result (metabolic, cardiac)       PCP: Roslyn Jay MD    History of Present Illness:  Ms. Simon Ann is a 31 yo female with PMH of ETOH cirrhosis c/b pulmonary HTN, ascices and recurrent v (Temporal)   Resp 17   Ht 5' 7\" (1.702 m)   Wt 186 lb (84.4 kg)   SpO2 100%   BMI 29.13 kg/m²     BP Readings from Last 3 Encounters:  01/05/18 : 111/59  11/10/17 : 104/69  10/26/17 : 122/68    Wt Readings from Last 3 Encounters:  01/05/18 : 186 lb (84.4 -- US shows patency of TIP    # OLIVIER  - Creatinine up slightly from baseline  - Likely pre-renal from blood loss  - Tranfusion as above  - check urine lytes  - Repeat in AM  - Hold home diuretics    # Depression  - Continue home lexapro    Prophy:  DVT: SCD

## 2018-01-05 NOTE — ED PROVIDER NOTES
Patient Seen in: [facility] Emergency Department    History   Patient presents with:  Abnormal Result (metabolic, cardiac)    Stated Complaint: abnormal labs     HPI    The patient is a 61-year-old female with a history of liver failure, who is had Physical Exam  General: Nontoxic young adult female who is alert and oriented in no distress but she is significantly jaundiced. Neuro: CN II-XII intact. Grossly normal and symmetric motor strength and sensation.    HEENT: Anicteric sclera no lymph Abnormal; Notable for the following:     PT 24.6 (*)     INR 2.18 (*)     All other components within normal limits   PTT, ACTIVATED - Abnormal; Notable for the following:     PTT 47.9 (*)     All other components within normal limits    Narrative:      The condition.       Disposition and Plan     Clinical Impression:  Anemia, unspecified type  (primary encounter diagnosis)  Chronic [facility] without [facility]    Disposition:  Admit  1/5/2018  4:48 pm    Follow-up:  No follow-up provider specifie

## 2018-01-05 NOTE — ED INITIAL ASSESSMENT (HPI)
Pt c/o weakness, tightness in the chest with coughing, nausea x 2 days. Pt c/o chills. Pt has liver failure.

## 2018-01-06 NOTE — PROGRESS NOTES
[facility] Hospitalist Progress Note     [facility]      SUBJECTIVE/24 Hour Events:  No CP, SOB, or N/V.   Some diarrhea overnight  No hematochezia or melena  No abdominal pain today  Some shortness of breath    OBJECTIVE:  Temp:  [98.2 °F (36.8 °C)-99.6 °F analysis. FINDINGS:  The TIPS is patent with color flow with maximum velocity of 160 centimeter/second. The velocity generally stays above 150 cm/sec within the shunt. The main portal vein is hepatopetal with velocity of 78 centimeters/second.   The righ see if improves    # Shortness of breath  - Unclear etiology, may be from volume overload  - Will check CXR     # Depression  - Continue home lexapro     Prophy:  DVT: SCDs, given acute anemia and thrombocytopenia     Dispo: admitted for acute anemia     Q

## 2018-01-06 NOTE — PROGRESS NOTES
Potassium replaced on previous shift per protocol. While K was still infusing, K level was drawn. Will recheck K at 1230 with next lab draw to get accurate level and plan to replace as needed per protocol.

## 2018-01-06 NOTE — PROGRESS NOTES
PT ARRIVED ON UNIT FROM E.D. PT MADE COMFORTABLE. BLOOD TRANSFUSION IN PROGRESS. PT COMFORTABLE. PT HAS EASY NON LABORED BREATHING. DENIES ANY PAIN AT PRESENT TIME. PLAN OF CARE DISCUSSED. ALL QUESTIONS ANSWERED. INSTRUCTED TO CALL IF ANY NEEDS ARISE.  CALL

## 2018-01-06 NOTE — PROGRESS NOTES
Notified Dr. Nancy Ramirez regarding new consult, orders received and initiated. Notified Dr. Petrona Montenegro and Dr. Derrick Mcbride regarding + c. Diff.

## 2018-01-06 NOTE — PROGRESS NOTES
Notified Dr. Ti Gooden regarding hgb results. No new orders. Will monitor bp and notify MD if bp < 90 systolic or symptoms of low hgb.  K 3.5. Discussed replacing K with Dr. Ti Gooden, ok to wait for renal to see patient regarding k replacement.   Will con

## 2018-01-06 NOTE — CONSULTS
Gastroenterology Initial Consultation  I have personally seen and examined the patient.     Patient Name: Mavis Ness  Referring physician: Dr. Ana Quiros  Reason for consultation: Jaundice, cirrhosis  CC: Jaundice  HPI: This is a 29 yo woman who is well date: OTHER      Comment: wisdom teeth extraction  No date: TONSILLECTOMY  4/16/17,5/9/17,6/6/17: UPPER GI ENDOSCOPY,EXAM      Comment: with banding, several  Medications:   [COMPLETED] Pantoprazole Sodium (PROTONIX) 40 mg in Sodium Chloride 0.9 % 10 mL IV history of recurrent urinary tract infections, hematuria, dysuria, or nephrolithiasis           Psychiatric: The patient reports no history of depression, anxiety, suicidal ideation, or homicidal ideation           Oncologic: The patient reports on history 01/05/2018   ALT 30 01/05/2018   PTT 47.9 01/05/2018   INR 2.18 01/05/2018   PTP 24.6 01/05/2018    01/05/2018     Recent Labs   Lab  01/05/18   1500   GLU  104*   BUN  17   CREATSERUM  1.36*   CA  8.3   NA  131*   K  3.4*   CL  92*   CO2  29.0 evaluation in November, suggested much lower velocity. Thus, I am concerned about the possibility of some early TIPS partial occlusion. This would explain her lab abnormality.   However, another possibility would be worsening of her liver disease with dec

## 2018-01-06 NOTE — CONSULTS
[facility] - Nephrology  Report of Consultation    Prema Zuniga Patient Status:  Inpatient    3/19/1980 MRN [id number]   [facility] 3NW-A Attending Bella Gonsalves MD   Hosp Day # 1 PCP Fabian Chairez MD     Reason for cons IV push, 40 mg, Intravenous, Q24H  •  Midodrine HCl (PROAMATINE) tab 2.5 mg, 2.5 mg, Oral, TID  •  vancomycin (FIRST-VANCOMYCIN 50) 50 MG/ML oral solution 125 mg, 125 mg, Oral, Q6H  •  acetaminophen (TYLENOL) tab 650 mg, 650 mg, Oral, Q8H PRN  •  ondansetr Net             1466 ml     Wt Readings from Last 3 Encounters:  01/05/18 : 186 lb (84.4 kg)  11/08/17 : 191 lb 12.8 oz (87 kg)  10/26/17 : 179 lb (81.2 kg)      General: pleasant, well appearing  HEENT: NCAT  Neck: Supple  Cardiac: Regular rate and rhyt 01/05/2018   BACUR Rare (A) 01/05/2018   CAOXUR Occasional (A) 08/21/2017   HYLUR Present (A) 01/05/2018         IMAGING:     CXR 1/6/2018 personally reviewed - no infiltrate or volume overload       ASSESSMENT/PLAN:     Kirk Shetty is a 30year old fe

## 2018-01-06 NOTE — ED NOTES
Report given to Darron Perez, floor RN at this time. Relayed that blood still needs to be administered.

## 2018-01-06 NOTE — PLAN OF CARE
GASTROINTESTINAL - ADULT    • Minimal or absence of nausea and vomiting Progressing    • Maintains or returns to baseline bowel function Progressing            A/o x 4. Patient resting in bed. Denies pain. Denies dizziness or lightheadedness.  Complains

## 2018-01-07 NOTE — PROGRESS NOTES
Pt updated with am hemoglobin results. Informed that MD has ordered another 2 Units of PRBC. Pt verbalizes an understanding with POC.

## 2018-01-07 NOTE — PROGRESS NOTES
Gastroenterology Progress Note  Patient Name: Surinder Gomez  Chief Complaint: Jaundice, C.diff  S: The patient was found to have C.diff (+) diarrhea yesterday, now on Vancomycin. She reports no BM's overnight. No nausea/vomiting. No abdominal pain. be (+) for C.diff. Her renal function is also abnormal, and her creatinine has gradually worsened. Renal has seen the patient, and feels this is prerenal azotemia. Thus, all diuretics have been held, and hydration has been implemented.   Her Anurag Romero was not DOPPLER ONLY (CPT=93975), 1/05/2018, 16:49.     INDICATIONS:  anemia, evaluate for bleeding.  NO IV Contast. ORAL CONTRAST ONLY     TECHNIQUE:  Unenhanced multislice CT scanning was performed from the dome of the diaphragm to the pubic symphysis.  Dose redu visible mass.  Pelvic organs appropriate for patient age.    BONES:  No bony lesion or fracture.    LUNG BASES:  Small bilateral pleural effusions along with atelectasis is noted.   OTHER:  Negative.

## 2018-01-07 NOTE — PROGRESS NOTES
[facility] Hospitalist Progress Note     [facility]      SUBJECTIVE/24 Hour Events:  No visible bleeding  C diff positive  Abdominal pain improved  Renal function slightly up this AM  Bili up this AM    OBJECTIVE:  Temp:  [97.8 °F (36.6 °C)-98.9 °F (37.2 ° (EDV=83778)  INDICATIONS:  confirm TIPS patency  COMPARISON:  SHREYA , US ABDOMEN COMPLETE WITH DOPPLER(CPT=76700/29977), 11/08/2017, 18:13.  TECHNIQUE:  Ultrasound of the hepatic vessels was performed with real-time grayscale, color flow Doppler, and spec afternoon  - Will check CT abdomen/pelvis without contrast to evaluate  - Heme c/s  - No active signs of bleeding  - s/p TIPS (hx of variceal bleeding)  - GI c/s  - IV PPI    # C diff  - Diarrhea + for c diff  - PO vancomycin     # EtOH Cirrhosis, concern

## 2018-01-07 NOTE — PROGRESS NOTES
[facility]  Nephrology Progress Note    Yumi Nguyen Attending:  Torri Puri MD       SUBJECTIVE:     Cr is up and hg down   Pt states that she overall feels better  Preserved uop     PHYSICAL EXAM:     Vital Signs: /57 (BP Location: Rig 01/07/2018   EOABSO 0.13 01/07/2018   BAABSO 0.02 01/07/2018       Lab Results  Component Value Date   CREUR 51.80 01/06/2018       Lab Results  Component Value Date   COLORUR Praveena (A) 01/05/2018   CLARITY Clear 01/05/2018   SPECGRAVITY 1.011 01/05/2018 Follow. Heme consulted      Decompensated Etoh cirrhosis - GI following.  Bili up      C dif positive - vanco      Hypotension - better today with ivf and midodrine       Harshal Marroquin, [address] Nephrology

## 2018-01-07 NOTE — CONSULTS
Hematology/Oncology Consult Note        NAME: Ryanne Figueroa - ROOM: [id number] - MRN: [id number] - Age: 30year old - : 3/19/1980    Reason for Consult:  Anemia    Wendy Holcomb is a 30 y.o female with history of ETOH related cirrhosis with pulmonary htn, ascit headaches, no strokes or seizure history  Psyche: denies depression or anxiety  Hematologic: per HPI  Endocrine: denies thyroid history      Physical Exam:  BP 93/51 (BP Location: Left arm)   Pulse 75   Temp 99.3 °F (37.4 °C) (Oral)   Resp 18   Ht 1.702 m patient, please call with any questions.     Barbara Bruner M.D.  [address] [place] [address] Hematology and Oncology

## 2018-01-07 NOTE — CM/SW NOTE
sw consult noted for medications. sw spoke to RN who is unsure what this consult is related to. Per chart review pt is insured and should have access to medications.  RN should re-consult SW if there is a specific medication need

## 2018-01-07 NOTE — PLAN OF CARE
PAIN - ADULT    • Verbalizes/displays adequate comfort level or patient's stated pain goal Progressing          GASTROINTESTINAL - ADULT    • Minimal or absence of nausea and vomiting Progressing        VSS,afebrile.   Tolerating CLD, denies N/V or increase

## 2018-01-08 NOTE — PAYOR COMM NOTE
--------------  ADMISSION REVIEW     Payor: Jimmy Rinaldi #:  [id number]  Authorization Number: N/A    Admit date: 1/5/18  Admit time: 2000       Admitting Physician: Krystina Taylor MD  Attending Physician:  Prosper Begum complaint: abnormal labs   Other systems are as noted in HPI. Constitutional and vital signs reviewed. All other systems reviewed and negative except as noted above.     Physical Exam   ED Triage Vitals [01/05/18 1453]  BP: 107/54  Pulse: 89  Resp: 18 Abnormal; Notable for the following:     Glucose 104 (*)     Creatinine 1.36 (*)     GFR 50 (*)     Alkaline Phosphatase 151 (*)      (*)     Bilirubin, Total 25.4 (*)     Total Protein 8.7 (*)     Albumin 2.1 (*)     Sodium 131 (*)     Potassium 3. established. [facility]           Admission disposition: 1/5/2018  4:48 PM       Patient does have significant anemia, with hemoglobin of 6.0 down from 8.3. Type and screen and 1 unit of red blood cells were ordered.   I discussed the patient with Dr. Shannan Servin also noticed some increased jaundice. In the ED patient was noted to be anemic to 6.0 and thrombocytopenic to 60,000. Direct bili elevated at 15.2. Creatinine slightly above baseline at 1.36 (baseline 0.7 - 1.0).   Patient started on IV PPI and 1 uni murmur at upper sternal border[SB. 3]  Abd: soft, NT/ND, no hepatomegaly, +BS  MSK: moving all extremities, no edema  Neuro: no focal deficits  Skin: no rashes/lesions  Psych: normal mood/affect        Diagnostic Data:    CBC/Chem[SB. 1]  Recent Labs   Lab 1/8/2018 1248 Given 125 mg Oral Reena Quinonez RN    1/8/2018 0528 Given 125 mg Oral Christine Sarah, RN    3/5/8914 2233 Given 125 mg Oral Christine Lard, RN    9/3/1855 1730 Given 125 mg Oral Gonzales Tyler, MIKE      Midodrine HCl (PRO 97. 5   --   98.9   PLT  60.0*   --   63.0*   --   66.0*   --   58.0*   INR  2.18*   --   2.22*   --   2.74*   --   2.37*    < > = values in this interval not displayed.                 Recent Labs   Lab  01/05/18   1500  01/06/18   0457  01/06/18   1248  0 Intravenous Once   Followed by         potassium chloride 20 mEq in sodium chloride 0.9% 100 mL IVPB 20 mEq Intravenous Once   0.9%  NaCl infusion   Intravenous Once   Pantoprazole Sodium (PROTONIX) 40 mg in Sodium Chloride 0.9 % 10 mL IV push 40 mg Porfirio Aw 1/8/2018  1:49 PM

## 2018-01-08 NOTE — PROGRESS NOTES
[address]  Nephrology Progress Note    Yumiko Calle Attending:  Mendy Terry MD       SUBJECTIVE:     Cr better   Hg and haptoglobin low   Bili slightly down     PHYSICAL EXAM:     Vital Signs: /58 (BP Location: Left arm)   Pulse 82   Te 01/08/2018   BAABSO 0.03 01/08/2018       Lab Results  Component Value Date   CREUR 109.00 01/07/2018       Lab Results  Component Value Date   COLORUR Praveena (A) 01/05/2018   CLARITY Clear 01/05/2018   SPECGRAVITY 1.011 01/05/2018   GLUUR Negative 01/05/20 IVF.  - Cr improving - cont ivf      Anemia - s/p 1U prbcs. Follow. Heme consulted. Haptoglobin low; hg dropped again.      Decompensated Etoh cirrhosis - GI following.  Bili stable today     C dif positive - vanco      Hypotension - better with ivf and mid

## 2018-01-08 NOTE — PROGRESS NOTES
PATIENT HAS IV FLUIDS INFUSING, ON ROOM AIR BUT WEARS 2L OXYGEN AT NIGHT, ORAL VANCOMYCIN GIVEN FOR POSITIVE C-DIFF, VOIDING WELL, AMBULATES INDEPENDENTLY, SKIN AND SCLERA ARE JAUNDICED. PATIENT IS TOLERATING A CLEAR LIQUID DIET WELL.  PATIENT IS TO RECEIVE

## 2018-01-08 NOTE — PLAN OF CARE
PAIN - ADULT    • Verbalizes/displays adequate comfort level or patient's stated pain goal Adequate for Discharge          GASTROINTESTINAL - ADULT    • Minimal or absence of nausea and vomiting Adequate for Discharge        B/Ps remain stable, midodrine a

## 2018-01-08 NOTE — PROGRESS NOTES
[facility]  Progress Note    Mavis Ness Patient Status:  Inpatient    3/19/1980 MRN [id number]   [facility] 3NW-A Attending Ana Ramos MD   Hosp Day # 3 PCP Artice Simmonds, MD     Subjective:    She feels well  No new comp PM 8.0 (L)   4/16/2017      7:41 AM 8.8 (L)   4/16/2017      2:34 AM 10.7 (L)     Component WBC   Latest Ref Rng & Units 4.0 - 13.0 x10(3) uL   1/8/2018 5.8   1/7/2018 6.8   1/6/2018 8.0   1/5/2018 7.4   11/9/2017 7.4   11/8/2017 10.8   10/26/2017 9.7   9/ pantoprazole (PROTONIX) IV push  40 mg Intravenous Q24H   • vancomycin  125 mg Oral Q6H   • Midodrine HCl  5 mg Oral TID   • escitalopram  5 mg Oral Daily         PROBLEM LIST:     Patient Active Problem List:     Hyperglycemia     Thrombocytopenia ([facility] [address]

## 2018-01-08 NOTE — PROGRESS NOTES
[facility] Hospitalist Progress Note     [facility]      SUBJECTIVE/24 Hour Events:  Pt resting comfortably. No current complaints.      OBJECTIVE:  Temp:  [97.9 °F (36.6 °C)-99 °F (37.2 °C)] 98.3 °F (36.8 °C)  Pulse:  [81-87] 87  Resp:  [18] 18  BP: (93-11 SHREYA , US ABDOMEN COMPLETE WITH DOPPLER(CPT=76700/96991), 11/08/2017, 18:13.  TECHNIQUE:  Ultrasound of the hepatic vessels was performed with real-time grayscale, color flow Doppler, and spectral wave analysis.   FINDINGS:  The TIPS is patent with color pRBCs --> 6.1 --> 7.2 --> 6.7 --> 6.4  -1U PRBCs  - apprec GI and hematology consultations  - tentative plan for transfer to      # C diff  - Diarrhea + for c diff  - PO vancomycin     # EtOH Cirrhosis, concern for possible decompensation  - D Bili up to

## 2018-01-08 NOTE — CM/SW NOTE
Call received from Denae regarding possible transfer to [facility] for expedited evaluation for liver transplant.   Transfer center called [phone]  Fax  [phone]--PLQFY call back

## 2018-01-08 NOTE — PROGRESS NOTES
Anesthesia  Attempt iv x 1 without succes. No good visible targets. Patient needs a PICC or midline catheter.   Idania Steele MD

## 2018-01-08 NOTE — PROGRESS NOTES
[facility]    Gastroenterology Follow-Up Note      Kathryn Rosenthal Patient Status:  Inpatient    3/19/1980 MRN [id number]   [facility] 3NW-A Attending Melissa Hurtado MD   Hosp Day # 3 PCP Harley Lozano MD     Chief Complaint/Re jenni wood.       [address] [facility]  Gastroenterology/[facility] [place]  Gastroenterology

## 2018-01-09 NOTE — PROGRESS NOTES
[facility]    Gastroenterology Follow-Up Note      Natalia Alarcon Patient Status:  Inpatient    3/19/1980 MRN [id number]   [facility] 3NW-A Attending Riaz Yang MD   [facility] Day # 4 PCP Marilyn Valera MD     Chief Complaint/Re Gastroenterology

## 2018-01-09 NOTE — PROGRESS NOTES
Blood transfusion started. Observed for 15 minutes, pt denied any transfusion reactions. Vitals stable. Denies any needs at this time. Will continue to monitor.

## 2018-01-09 NOTE — PLAN OF CARE
RN called lab regarding stat Basic - EPIC showing Basic still in process since 0615 this am. RN spoke with babar Mc in lab, she will look into it.

## 2018-01-09 NOTE — PROGRESS NOTES
[facility]  Progress Note    Nina Connor Patient Status:  Inpatient    3/19/1980 MRN [id number]   [facility] 3NW-A Attending Garcia Kerns MD   Hosp Day # 4 PCP Gerardo Rodriguez MD     Subjective:    She feels well  No new comp Dyspnea, unspecified type     Ascites due to alcoholic cirrhosis (HCC)     Nosocomial pneumonia     Chronic liver failure without hepatic coma (HCC)      ASSESSMENT AND PLAN:     Jeremias Roy is a 30year old female with history of alcohol induced liver

## 2018-01-09 NOTE — PROGRESS NOTES
[facility]  Nephrology Progress Note    Gib Bobby Attending:  Daly Heaton MD       SUBJECTIVE:     Cr better   Starting to have some swelling  Hemodynamics better  Working on xfer to [facility]    PHYSICAL EXAM:     Vital Signs: /68 (BP [place] 01/09/2018   EOABSO 0.09 01/09/2018   BAABSO 0.03 01/09/2018       Lab Results  Component Value Date   CREUR 109.00 01/07/2018       Lab Results  Component Value Date   COLORUR Praveena (A) 01/05/2018   CLARITY Clear 01/05/2018   SPECGRAVITY 1.011 01/05/2018 Haptoglobin low; hg stable after transfusion with negative ANTON.      Decompensated Etoh cirrhosis - GI following. Bili and Cr coming down; MELD score not as high as before.  Check CMP tomorrow.     C dif positive - vanco      Hypotension - better with ivf a

## 2018-01-09 NOTE — PAYOR COMM NOTE
--------------  ADMISSION REVIEW     Payor: Jimmy Rinaldi #:  [id number]  Authorization Number: [id number]    Admit date: 1/5/18  Admit time: 2000       Admitting Physician: Lorenzo Bhatia MD  Attending Physician for stated complaint: abnormal labs   Other systems are as noted in HPI. Constitutional and vital signs reviewed. All other systems reviewed and negative except as noted above.     Physical Exam   ED Triage Vitals [01/05/18 1453]  BP: 107/54  Pulse: 8 (14) - Abnormal; Notable for the following:     Glucose 104 (*)     Creatinine 1.36 (*)     GFR 50 (*)     Alkaline Phosphatase 151 (*)      (*)     Bilirubin, Total 25.4 (*)     Total Protein 8.7 (*)     Albumin 2.1 (*)     Sodium 131 (*)     Potas was established. [facility]           Admission disposition: 1/5/2018  4:48 PM       Patient does have significant anemia, with hemoglobin of 6.0 down from 8.3. Type and screen and 1 unit of red blood cells were ordered.   I discussed the patient with Dr. Liliane Vallse noticed some increased jaundice. In the ED patient was noted to be anemic to 6.0 and thrombocytopenic to 60,000. Direct bili elevated at 15.2. Creatinine slightly above baseline at 1.36 (baseline 0.7 - 1.0).   Patient started on IV PPI and 1 unit pRB border[SB. 3]  Abd: soft, NT/ND, no hepatomegaly, +BS  MSK: moving all extremities, no edema  Neuro: no focal deficits  Skin: no rashes/lesions  Psych: normal mood/affect        Diagnostic Data:    CBC/Chem[SB. 1]  Recent Labs   Lab  01/05/18   1500   WBC  7 Aleks Thompson RN    1/9/2018 0551 Given 125 mg Oral Christ Perez RN    1/8/2018 2304 Given 125 mg Oral Christ Perez RN    1/8/2018 1735 Given 125 mg Oral Kasia Belle, RN      Midodrine HCl (PROAMATINE) tab 5 mg     Date Action Dose Route CTAB  Abdomen: Soft, non-tender, non-distended  Extremities: + LE edema  Neurologic/Psych: mentating well        LABORATORY DATA:         Lab Results  Component Value Date   GLU 82 01/09/2018   BUN 11 01/09/2018   CREATSERUM 1.17 (H) 01/09/2018   GFR 61 01 40 mg in Sodium Chloride 0.9 % 10 mL IV push 40 mg Intravenous Q24H   vancomycin (FIRST-VANCOMYCIN 50) 50 MG/ML oral solution 125 mg 125 mg Oral Q6H   Midodrine HCl (PROAMATINE) tab 5 mg 5 mg Oral TID   TraMADol HCl (ULTRAM) tab 50 mg 50 mg Oral Q6H PRN

## 2018-01-09 NOTE — CM/SW NOTE
Call to [facility] [phone] - currently on diversion, not accepting transfers at this time. Dr Rafael Snow made aware, instructed to try [facility] [phone] - also on transfer hold, unable to take patient demographics.  Dr Rafael Snow updated, instru

## 2018-01-10 NOTE — PROGRESS NOTES
Notified by Dr. Xochilt Monsalve that [facility] has acceptted patient. Leana Thurston,  notified and stated she spoke with  [facility] who will call when bed available. Patient updated on poc and shows understanding.

## 2018-01-10 NOTE — PROGRESS NOTES
[facility]  Nephrology Progress Note    Jose Hancock Attending:  Bill Parsons MD       SUBJECTIVE:     Cr better   Starting to have some swelling  Hemodynamics better  Working on "[employer]er to [employer] now       PHYSICAL EXAM:     Vital Signs: /65 EOABSO 0.09 01/09/2018   BAABSO 0.03 01/09/2018       Lab Results  Component Value Date   CREUR 109.00 01/07/2018       Lab Results  Component Value Date   COLORUR Praveena (A) 01/05/2018   CLARITY Clear 01/05/2018   SPECGRAVITY 1.011 01/05/2018   GLUUR Neg early ATN  - lasix 20, renetta 25      Anemia - s/p 1U prbcs. Follow. Heme consulted. Haptoglobin low; hg stable after transfusion with negative ANTON.      Decompensated Etoh cirrhosis - GI following.  Bili up     C dif positive - vanco      Hypotension - bett

## 2018-01-10 NOTE — PLAN OF CARE
GASTROINTESTINAL - ADULT    • Minimal or absence of nausea and vomiting Progressing        Patient/Family Goals    • Patient/Family Long Term Goal Progressing        SKIN/TISSUE INTEGRITY - ADULT    • Incision(s), wounds(s) or drain site(s) healing without

## 2018-01-10 NOTE — PROGRESS NOTES
[facility] Hospitalist Progress Note     [facility]      SUBJECTIVE/24 Hour Events:  Pt resting comfortably. No current complaints.      OBJECTIVE:  Temp:  [97.9 °F (36.6 °C)-99.3 °F (37.4 °C)] 97.9 °F (36.6 °C)  Pulse:  [81-87] 87  Resp:  [15-20] 20  BP: ( The main portal vein is hepatopetal with velocity of 78 centimeters/second. The right portal vein is hepatofugal as expected with a TIPS with a velocity of suspected 5 cm/sec. Hepatic artery is normal in waveform.       CONCLUSION:  The TIPS is patent wit pre-renal, started on IVF   - Cr improving 1.6-->1.7-->1.3-->1. 1    # Shortness of breath  - Improved  - CXR ok     # Depression  - Continue home lexapro     Prophy:  DVT: SCDs, given acute anemia and thrombocytopenia     Dispo: tentative plan for transfer

## 2018-01-10 NOTE — PROGRESS NOTES
[facility] Hospitalist Progress Note     [facility]      SUBJECTIVE/24 Hour Events:  Pt resting comfortably.  Worried that she is feeling more swollen, glad her lasix is being restarted per renal    OBJECTIVE:  Temp:  [98 °F (36.7 °C)-99.4 °F (37.4 °C)] 98 portal vein is hepatopetal with velocity of 78 centimeters/second. The right portal vein is hepatofugal as expected with a TIPS with a velocity of suspected 5 cm/sec. Hepatic artery is normal in waveform.       CONCLUSION:  The TIPS is patent with normal midodrine 2.5 mg TID     # OLIVIER  - Tranfusion as above  - check urine lytes --> Fe Urea 30.9%  - nephrology c/s -- pre-renal, started on IVF   - Cr improving 1.6-->1.7-->1.3-->1. 1    # Shortness of breath  - Improved  - CXR ok     # Depression  - Continue h

## 2018-01-10 NOTE — PROGRESS NOTES
Writing RN spoke to transfer dept at [facility], still waiting on bed at this time. Pt remains on transfer list. Primary RN-Pattie updated on call.

## 2018-01-10 NOTE — CONSULTS
[facility] Note: Route Optimization for Pantoprazole (PROTONIX)    Patient is currently on Pantoprazole (PROTONIX) 40 mg IV every 24 hours.    The patient meets the criteria to convert to the oral equivalent as established by the IV to Oral conversion pro

## 2018-01-11 NOTE — CM/SW NOTE
Case discussed in rounds, [facility] center called primary rn, currently on bypass, will call nursing staff when bed available.      Eugene Negrete RN,   Phone [phone]  Pager [phone]

## 2018-01-11 NOTE — PROGRESS NOTES
Brief GI note:    No new recs. Patient accepted at [facility]. Awaiting transfer.      Tanya Melendez  Gastroenterology/[facility] [address] Gastroenterology

## 2018-01-11 NOTE — PROGRESS NOTES
RECEIVED CALL FROM [facility] REGARDING TRANSFER, STATES THEY HAVE NO BED AVAILABLE BUT PT STILL ON LIST TO TRANSFER AND WILL CALL WHEN BED AVAILABLE

## 2018-01-11 NOTE — PROGRESS NOTES
[facility]  Progress Note    Nina Connor Patient Status:  Inpatient    3/19/1980 MRN [id number]   [facility] 3NW-A Attending Garcia Kerns MD   Hosp Day # 6 PCP Gerardo Rodriguez MD     Subjective:    She feels well  No new comp Dyspnea     Dyspnea, unspecified type     Ascites due to alcoholic cirrhosis (HCC)     Nosocomial pneumonia     Chronic liver failure without hepatic coma (HCC)      ASSESSMENT AND PLAN:     Kirk Shetty is a 30year old female with history of alcohol

## 2018-01-11 NOTE — PROGRESS NOTES
[facility]  Nephrology Progress Note    Jeremias Roy Attending:  Armani Wilson MD       SUBJECTIVE:     Cr stable  Still awaiting transfer - [facility] on bypass now       PHYSICAL EXAM:     Vital Signs: /62 (BP Location: Left arm)   Pulse 96 01/09/2018       Lab Results  Component Value Date   CREUR 109.00 01/07/2018       Lab Results  Component Value Date   COLORUR Praveena (A) 01/05/2018   CLARITY Clear 01/05/2018   SPECGRAVITY 1.011 01/05/2018   GLUUR Negative 01/05/2018   BILUR Moderate (A) 0 consulted. Haptoglobin low; hg stable after transfusion with negative ANTON.      Decompensated Etoh cirrhosis - GI following.  Bili up     C dif positive - vanco      Hypotension - better with ivf and midodrine       Await transfer to [place] [facility],

## 2018-01-12 NOTE — PLAN OF CARE
Problem: GASTROINTESTINAL - ADULT  Goal: Minimal or absence of nausea and vomiting  INTERVENTIONS:  - Maintain adequate hydration with IV or PO as ordered and tolerated  - Nasogastric tube to low intermittent suction as ordered  - Evaluate effectiveness of distraction and/or relaxation techniques  - Monitor for opioid side effects  - Notify MD/LIP if interventions unsuccessful or patient reports new pain  - Anticipate increased pain with activity and pre-medicate as appropriate   Outcome: Progressing  Denies

## 2018-01-12 NOTE — PROGRESS NOTES
GI brief note:    No new recs. Ok to discharge home with close follow with Dr. Ryan Heart if no bed available at [facility] today. Otherwise if bed available today, ok to transfer.     Vero King  Gastroenterology/[facility] [address] Gastroenterology

## 2018-01-12 NOTE — PROGRESS NOTES
[facility]  Nephrology Progress Note    Hyatt Pencil Attending:  Bing Malhotra MD       SUBJECTIVE:     Cr stable  Still awaiting transfer   Feels better with the lasix       PHYSICAL EXAM:     Vital Signs: BP 97/53 (BP Location: Right arm)   Pu 0.03 01/09/2018       Lab Results  Component Value Date   CREUR 109.00 01/07/2018       Lab Results  Component Value Date   COLORUR Praveena (A) 01/05/2018   CLARITY Clear 01/05/2018   SPECGRAVITY 1.011 01/05/2018   GLUUR Negative 01/05/2018   BILUR Moderate transfusion with negative ANTON.      Decompensated Etoh cirrhosis - GI following. Bili up     C dif positive - vanco      Hypotension - better with ivf and midodrine       [facility]    Will sign off.        Micah Mortensen DO  [facility]

## 2018-01-12 NOTE — PROGRESS NOTES
[facility] Hospitalist Progress Note     [facility]      SUBJECTIVE/24 Hour Events:  Pt resting comfortably.  No specific complaints    OBJECTIVE:  Temp:  [98.3 °F (36.8 °C)-98.5 °F (36.9 °C)] 98.3 °F (36.8 °C)  Pulse:  [96-97] 96  Resp:  [16-18] 16  BP: (1 expected with a TIPS with a velocity of suspected 5 cm/sec. Hepatic artery is normal in waveform. CONCLUSION:  The TIPS is patent with normal velocities.     Dictated by: Caterina Cordova MD on 1/05/2018 at 17:19     Approved by: Caterina Cordova MD Improved  - CXR ok     # Depression  - Continue home lexapro     Prophy:  DVT: SCDs, given acute anemia and thrombocytopenia     Dispo: [facility] to [facility] but no on bypass     Niels Kayser, MD  [facility] IM Hospitalist  Pager: [phone]

## 2018-01-12 NOTE — PROGRESS NOTES
Discharge/transfer paperwork given to . IV patent and flushed as well as capped. Insertion site CDI, no redness, bruising, or drainage.  Paulette from [facility] requesting IV be left in.  Prescription and chart in folder and given to ambulance d

## 2018-01-15 NOTE — PAYOR COMM NOTE
--------------  CONTINUED STAY REVIEW    Payor: Jimmy Rinaldi #:  [id number]  Authorization Number: [id number]    Admit date: 1/5/18  Admit time: 2000    Admitting Physician: Chloe Lombardo MD  Attending Roseanne Farias TIPS who presented to the ED on 1/5 for n/v and abd bloating     OLIVIER -   - BPs overall stable with some low readings . No nephrotoxins. Admit ULytes clearly pre-renal, got 1U PRBCs and small amount of IVF.  Cr with uptrend today, dose of lasix given after a She is s/p TIPS, but presented with complaints of fatigue, nausea, increased jaundice, and diarrhea. She has been found to be (+) for C.diff. Her renal function is also abnormal, and her creatinine has gradually worsened.   Renal has seen the patient, and old female with history of alcohol induced liver cirrhosis and portal hypertension. In for C diff. She feels well.       1. Anemia - in past had variceal bleeding  Hgb on admission was low   The retic is high and hapto is low suggestive of hemolysis   Would tomorrow if remains in house to ensure stability     *MELD 31.  Transfer to tertiary center for expedited transplant evaluation [facility]), social work team to eval. If no bed available by tomorrow and hgb remains stable, ok to discharge with close foll Date: 1/12/2018  5:37 PM     Admitting Physician: Rafal Berman MD  Attending Physician:  No att. providers found  Primary Care Physician: Bonita Carcamo MD       Discharge Summary Notes    Huey Silvestre RN Registered Nurse Signed Nursing Progress

## 2018-01-15 NOTE — DISCHARGE SUMMARY
General Medicine Discharge Summary     Patient ID:  Kathryn Rosenthal  30year old  3/19/1980    Admit date: 1/5/2018    Discharge date and time: 1/12/2018  5:37 PM     Attending Physician: No att. providers Hold propranolol     # Hypotension  - Improved today  - on IVF  - Continue midodrine 2.5 mg TID     # OLIVIER  - Tranfusion as above  - check urine lytes --> Fe Urea 30.9%  - nephrology c/s -- pre-renal, started on IVF               - Cr improving 1.6-->1.7--> lymph nodes are noted. A representative aortocaval lymph node measures 1.2 x 0.8 cm (image 66). BOWEL/MESENTERY:  There is diverticulosis of the colon without evidence of acute diverticulitis.  ABDOMINAL WALL:  Diffuse subcutaneous edema surrounding the ab FINDINGS:  There is some density at the left lung base which is favored represent small left pleural effusion with possible mild loculation. Mild cardiomegaly is present. CONCLUSION:  Possible trace to small left pleural effusion.     Dictated by: Raquel Sears printed, Disp-30 tablet, R-3    escitalopram 5 MG Oral Tab  Take 1 tablet (5 mg total) by mouth daily. , Script printed, Disp-30 tablet, R-1    omeprazole 20 MG Oral Capsule Delayed Release  Take 1 capsule (20 mg total) by mouth every morning., Script print

## 2018-01-15 NOTE — CM/SW NOTE
01/15/18 0900   Discharge disposition   Discharged to:  [facility]   Name of Facillity/Home Care/Hospice Sola Coello

## 2018-01-30 PROBLEM — N17.9 ACUTE KIDNEY INJURY (HCC): Status: ACTIVE | Noted: 2018-01-01

## 2018-01-30 PROBLEM — N17.9 ACUTE RENAL FAILURE (ARF) (HCC): Status: ACTIVE | Noted: 2018-01-01

## 2018-01-30 PROBLEM — J18.9 COMMUNITY ACQUIRED PNEUMONIA OF LEFT LOWER LOBE OF LUNG: Status: ACTIVE | Noted: 2018-01-01

## 2018-01-30 PROBLEM — R17 JAUNDICE: Status: ACTIVE | Noted: 2018-01-01

## 2018-01-30 NOTE — ED INITIAL ASSESSMENT (HPI)
PT [facility], HAD LABS DRAWN ON Tuesday WITH ABNORMAL RESULTS. ELEVATED BILIRUBIN. PT C/O FEELING GENERALIZED FATIGUE AND WEAKNESS X ONE WEEK. C/O NAUSEA, DENIES VOMITING, RECENTLY TREATED FOR C-DIFF.   PT Cee Oreilly

## 2018-01-30 NOTE — CONSULTS
[facility] Patient Status:  Emergency    3/19/1980 MRN [id number]   Location [address] [facility] Attending Camelia Salgado MD   Hosp Day # 0 PCP Jesus Thorne MD     Date of Admission: 2018  Admission D Medications:    No outpatient prescriptions have been marked as taking for the 1/30/18 encounter [facility] Encounter).      Current Medications:    Current Facility-Administered Medications:   •  sodium chloride 0.9% IV bolus 2,571 mL, 30 mL/kg, Intravenous membranes, scleral icterus                          Lungs: decreased BS over right lung base otherwise clear                          Chest wall: No tenderness or deformity.                           FMDKZ: COWSBYB rate and rhythm, normal S1S2               and narrow antibiotics as appropriate  -IS to bedside  6. Depression:  -continue home meds  7. FEN:  -diet  8. Proph:   -SCDs   9.  Dispo:  -ICU, at risk for further decompensation     Baldev Jeong MD  1/30/2018  4:35 PM

## 2018-01-30 NOTE — ED PROVIDER NOTES
Patient Seen in: [facility] Emergency Department    History   Patient presents with:  Abnormal Result (metabolic, cardiac)    Stated Complaint: abnormal labs, weakness, liver failure    HPI    29-year-old white female with a history of liver failure HPI.  Constitutional and vital signs reviewed. All other systems reviewed and negative except as noted above.     Physical Exam   ED Triage Vitals  BP: 102/70 [01/30/18 1239]  Pulse: 77 [01/30/18 1239]  Resp: 18 [01/30/18 1228]  Temp: 98.1 °F (36.7 °C) components within normal limits   COMP METABOLIC PANEL (14) - Abnormal; Notable for the following:     Glucose 109 (*)     BUN 27 (*)     Creatinine 2.30 (*)     GFR 27 (*)     Alkaline Phosphatase 117 (*)     AST 62 (*)     Bilirubin, Total 37.3 (*)     A PLATELET.   Procedure                               Abnormality         Status                     ---------                               -----------         ------                     CBC W/ DIFFERENTIAL[[id number]]          Abnormal            Final resul Patient had an IV established in the emergency room was given IV fluids and had laboratory studies sent. Laboratory workup reveals patient is significantly jaundice as well has seen on her clinical exam she had a total bilirubin of 37.3.   Patient's ches Reviewed: 8/21/2017          ICD-10-CM Noted POA    Acute kidney injury ([facility] [address] N17.9 1/30/2018 Yes    Acute renal failure (ARF) ([facility] [address] N17.9 1/30/2018 Yes    Jaundice R17 1/30/2018 Unknown

## 2018-01-30 NOTE — H&P
General Medicine H&P     Patient presents with:  Abnormal Result (metabolic, cardiac)       PCP: Ted Avendaño MD    History of Present Illness: Patient is a 30year old female with PMH sig for etoh abuse, alcoholic cirrhosis c/b portal HTN, recurrent supple, no LAD  CV: rrr, +s1/s2, no murmors  Lungs: CTAB, no wheezes  Abd: s/nt, no makred fluid wave  LE: no c/e/e  Neuro: CN 2-12 intact, no focal deficits  Skin: +jaundice    LABS:     Lab Results  Component Value Date   WBC 9.0 01/30/2018   HGB 5.8 01/ ABDOMEN+PELVIS(CPT=74176)  COMPARISON:  SHREYA , CT ABDOMEN(W+WO)PELVIS(CNTRST ONLY)(CPT=74178), 4/17/2017, 13:32. SHREYA , US ABDOMEN DOPPLER ONLY (CPT=93975), 1/05/2018, 16:49. INDICATIONS:  anemia, evaluate for bleeding.  NO IV Contast. ORAL CONTRAST O prominent right external iliac chain lymph node measures 2.3 x 0.9 cm (image 107). PELVIC ORGANS:  No visible mass. Pelvic organs appropriate for patient age. BONES:  No bony lesion or fracture.   LUNG BASES:  Small bilateral pleural effusions along with Ultrasound of the hepatic vessels was performed with real-time grayscale, color flow Doppler, and spectral wave analysis. FINDINGS:  The TIPS is patent with color flow with maximum velocity of 160 centimeter/second.   The velocity generally stays above 150

## 2018-01-30 NOTE — CONSULTS
[facility]                       Gastroenterology [address] [facility] Gastroenterology    [facility] Patient Status:  Emergency    3/19/1980 MRN [id number]   Location [facility] [facility] Attending Christa Stanton, [facility] Intravenous Once     Allergies: No Known Allergies  SocHx:  No history of smoking; Hx of etOH abuse, no intake since 9/17; The patient has no history of IV drug use or other illicit substances.   FamHx: The patient has no family history of colon cancer or non-distended with the presence of bowel sounds; No hepatosplenomegaly; no rebound or guarding;  No ascites is clinically apparent; no tympany to percussion  Rectum: Yellow stool without overt signs of bleeding   Ext: No peripheral edema or cyanosis  Skin: tortuosity of the thoracic aorta. BONES:  Normal for age.      Metallic stent projecting over the liver and coils in the upper abdomen suggest previous TIPS and previous variceal coiling.     =====  CONCLUSION:  Worsening bilateral lower lobe infiltrates w for encephalopathy prophylaxis, continue midodrine and non selective B blocker.  Ultrasound liver doppler  [address] [facility] Gastroenterology  1/30/2018  3:13 PM

## 2018-01-31 NOTE — ED NOTES
Report given to [facility], RN who states  is still dirty, will call when bed is ready. Patient moved to hallway bed with monitor.

## 2018-01-31 NOTE — CONSULTS
[facility] [address] [place] [address] - Nephrology  Report of Consultation    Melanipraveenazeinab Jones Patient Status:  Inpatient    3/19/1980 MRN [id number]   [facility] 4SW-A Attending Mik Covarrubias MD   Hosp Day # 1 PCP Chyrl Boas, MD     Reason for mg, Oral, TID  •  Propranolol HCl (INDERAL) tab 10 mg, 10 mg, Oral, TID  •  vancomycin (FIRST-VANCOMYCIN 50) 50 MG/ML oral solution 125 mg, 125 mg, Oral, BID  •  Piperacillin Sod-Tazobactam So (ZOSYN) 3.375 g in dextrose 5 % 100 mL ADD-vantage, 3.375 g, In Output              350 ml   Net             6087 ml     Wt Readings from Last 3 Encounters:  01/30/18 : 197 lb 12 oz (89.7 kg)  01/05/18 : 186 lb (84.4 kg)  11/08/17 : 191 lb 12.8 oz (87 kg)      General: pleasant, well appearing, sleepy  HEENT: NCAT  N 01/30/2018   RBCUR 0-2 01/30/2018   EPIUR Large (A) 01/30/2018   BACUR 3+ (A) 01/30/2018   CAOXUR Occasional (A) 08/21/2017   HYLUR Present (A) 01/05/2018         IMAGING:     CXR 1/31/2018 personally reviewed - R effusion      ASSESSMENT/PLAN:     Cody Jiang

## 2018-01-31 NOTE — PLAN OF CARE
GASTROINTESTINAL - ADULT    • Maintains or returns to baseline bowel function Not Progressing        HEMATOLOGIC - ADULT    • Maintains hematologic stability Not Progressing    • Free from bleeding injury Not Progressing        NEUROLOGICAL - ADULT    • Ac

## 2018-01-31 NOTE — ED NOTES
Patient sleeping, awakened by voice and mild stimulation. Patient updated on plan of care, awaiting on room to be cleaned.

## 2018-01-31 NOTE — PROGRESS NOTES
[facility] Hospitalist Progress Note     PCP: Radha Sin MD    Chief Complaint: follow-up    Overnight/Interim Events:    SUBJECTIVE:  Pt laying in bed. C/o groin pain from haynes placement. MS occasionally lethargic.      OBJECTIVE:  Temp:  [96.2 °F (35.7 Intravenous Q8H   • Octreotide Acetate  100 mcg Subcutaneous Q8H   • pantoprazole (PROTONIX) IV push  40 mg Intravenous Q24H   • vancomycin  250 mg Oral QID   • rifaximin  550 mg Oral BID   • LORazepam  0.5 mg Intravenous Once   • lactulose  20 g Oral TID

## 2018-01-31 NOTE — PROGRESS NOTES
[address] [facility] Patient Status:  Inpatient    3/19/1980 MRN [id number]   [facility] 4SW-A Attending Medardo Kidd MD   Hosp Day # 1 PCP Harley Lozano MD     Pulm / Critical Care Progress Note     S: pt states s 99*  99*   CO2  28.0  28.0  27.0   BUN  27*  28*  29*       Creatinine (mg/dL)   Date Value   01/31/2018 2.55 (H)   01/30/2018 2.41 (H)   01/30/2018 2.30 (H)   ----------]    Recent Labs   Lab  01/30/18   1243  01/31/18   0447   ALT  21  24   AST  62*  62

## 2018-01-31 NOTE — CONSULTS
[facility]  Report of Consultation    Jose Hancock Patient Status:  Inpatient    3/19/1980 MRN [id number]   [facility] 4SW-A Attending Nathaly Soto MD   Hosp Day # 1 PCP Susan Linares MD     REASON FOR CONSULTATION: (PROAMATINE) tab 2.5 mg, 2.5 mg, Oral, TID  •  Propranolol HCl (INDERAL) tab 10 mg, 10 mg, Oral, TID  •  vancomycin (FIRST-VANCOMYCIN 50) 50 MG/ML oral solution 125 mg, 125 mg, Oral, BID  •  Piperacillin Sod-Tazobactam So (ZOSYN) 3.375 g in dextrose 5 % 10 PTP 24.3 01/30/2018         Component       WBC RBC Hemoglobin   Latest Ref Rng & Units       4.0 - 13.0 x10(3) uL 3.80 - 5.10 x10(6)uL 12.0 - 16.0 g/dL   1/31/2018       10.1 2.25 (L) 7.3 (L)   1/30/2018      11:19 PM 8.8 1.83 (L) 6.3 (LL)   1/30/2018 (L) 78.0 (L) 98.8   4/17/2017       23.2 (L) 56.0 (L) 97.9   4/16/2017       33.2 (L) 61.0 (L) 94.9       Imaging:          PROBLEM LIST:     Patient Active Problem List:     Hyperglycemia     Thrombocytopenia (HCC)     Anemia     Gastrointestinal hemorrha illness. So far she had been responding to PRBC transfusions and we can continue the same.     However given the nature of the illness, there will come a point where transfusion will not help    She has splenomegaly this can contribute to anemia as well

## 2018-01-31 NOTE — ED NOTES
Bedside report taken from [facility]. Await ICU bed assignment. Patient updated on plan of care.

## 2018-01-31 NOTE — PLAN OF CARE
GASTROINTESTINAL - ADULT    • Minimal or absence of nausea and vomiting Not Progressing    • Maintains or returns to baseline bowel function Not Progressing        HEMATOLOGIC - ADULT    • Maintains hematologic stability Not Progressing    • Free from blee

## 2018-01-31 NOTE — PAYOR COMM NOTE
--------------  ADMISSION REVIEW     Payor: Jimmy Rinaldi #:  [id number]  Authorization Number: N/A    Admit date: 1/30/18  Admit time: 2122       Admitting Physician: Amrik Barahona MD  Attending Physician:  Katey Hameed Propanolol for the varicies     Past Surgical History:  08/2017: ABD PARACENTESIS  No date: OTHER      Comment: wisdom teeth extraction  No date: TONSILLECTOMY  4/16/17,5/9/17,6/6/17: UPPER GI ENDOSCOPY,EXAM      Comment: with banding, several    Review of following:        Result Value    Urine Color Praveena (*)     Clarity Urine Hazy (*)     Glucose Urine 50  (*)     Bilirubin Urine Moderate (*)     Blood Urine Small (*)     Protein Urine 30  (*)     Urobilinogen Urine 4.0 (*)     WBC Urine 11-20 (*)     Jairo All other components within normal limits   AMMONIA, PLASMA - Normal   CBC WITH DIFFERENTIAL WITH PLATELET   LACTIC ACID, PLASMA   LACTIC ACID, PLASMA   POCT PREGNANCY, URINE   TYPE AND SCREEN   PREPARE RBC   ABORH (BLOOD TYPE)   ANTIBODY SCREEN   PREPARE come up with IV fluids and packed red blood cell. He was started on IV antibiotics here. She was given a unit of packed red blood cells here.   I spoke to the [facility] pulmonologist and she will consult for critical care medicine since patient is going to the [facility] ab[facility]. Very itch, one med worked at [employer]. Reports dry cough. No f/c. Labwork today noted elevated bili to 27.3 and hgb 5.8. SBP . She reports last drink in 9/17 before TIPS. Last tap for asciites at that time too.  Being worked up TIPS shunt is noted. Minimal ascites surrounding the liver and spleen is noted. Mild retroperitoneal lymphadenopathy is stable. Mild pelvic lymphadenopathy is noted. There is diverticulosis of the colon without evidence of acute diverticulitis.   Inflam SHAINA RN      lactulose (CHRONULAC) 10 GM/15ML solution 20 g     Date Action Dose Route User    1/31/2018 1147 Given 20 g Oral Stefanie Frankelmarichuy Shabbona    1/30/2018 2247 Given 20 g Oral Nicole Sutton RN      Midodrine HCl (PROAMATINE) tab 2.5 mg     Félix Action Dose Route User    1/30/2018 1445 New Bag 1000 mL Intravenous Colletta Redwood, RN      sodium chloride 0.9% IV bolus 2,571 mL     Date Action Dose Route User    1/30/2018 1615 New Bag 1000 mL Intravenous Colletta Redwood, RN          REVIEWER COMME

## 2018-01-31 NOTE — ED NOTES
Pt crying, states the benadryl isnt working and requesting some ativan, states shes having a full blown panic attack, Dr Justo Cha made aware, VSS, ativan given

## 2018-01-31 NOTE — ED NOTES
Blood infused w/o difficulty, pt tolerated well, warm blankets applied and lights dimmed for comfort, awaiting ICU bed

## 2018-02-01 NOTE — PROGRESS NOTES
[facility]  Nephrology Progress Note    [facility] Attending: Breann De Santiago MD       SUBJECTIVE:     Patient more awake and alert today. Minimal urine output. Creatinine uptrending. Blood pressure stable. Bili rising.     PHYSICAL EXAM 02/01/2018   NE 7.24 (H) 02/01/2018   LYMABS 1.10 02/01/2018   MOABSO 0.80 (H) 02/01/2018   EOABSO 0.14 02/01/2018   BAABSO 0.05 02/01/2018       Lab Results  Component Value Date   CREUR 190.00 01/31/2018       Lab Results  Component Value Date   Miles Bhandari Arabella Crenshaw is a 30year old female with past medical history significant for etoh cirrhosis with liver failure, s/p TIPS with recent hospitalization for liver decompensation with C dif who presents with worsening of liver function.      OLIVIER - most l

## 2018-02-01 NOTE — PROGRESS NOTES
[facility]  Progress Note    Kenia Lozada Patient Status:  Inpatient    3/19/1980 MRN [id number]   [facility] 4SW-A Attending Eric Peñaloza MD   Hosp Day # 2 PCP Ned Dewitt MD     Subjective:    Continues to be weak cirrhosis of liver without ascites (HCC)     Gastrointestinal hemorrhage, unspecified gastrointestinal hemorrhage type     Acute liver failure without hepatic coma     Melena     Transaminitis     Secondary esophageal varices with bleeding ([facility] [address]     Coagul hemolysis.     3.  Thrombocytopenia - related to cirrhosis and splenomegaly      No other suggestions        Thank you for allowing me to participate in the care of your patient.  Ami De Luna MD

## 2018-02-01 NOTE — PLAN OF CARE
VSS.  Up to commode with SB assist.   Fatigued and drowsy at times, but conversant with staff. Nguyen removed per Dr. Timbo Hdez at pt's request.  Poor appetite, food encouraged.   Per Dr. Timbo Hdez, transfer to tertiary center at this time is up to GI, no need to i

## 2018-02-01 NOTE — PROGRESS NOTES
[facility]  Progress Note    Bonita Juarez Patient Status:  Inpatient    3/19/1980 MRN [id number]   [facility] 4SW-A Attending Cleveland Ayala MD   [address] Day # 2 PCP Shad Scott MD     Subjective:  Bonita Juarez is a(n) Coagulopathy ([facility] [address]     Acute blood loss anemia     Hyponatremia     Alcoholic cirrhosis of liver with ascites (HCC)     Hyperbilirubinemia     Anxiety disorder     Depressive disorder     Severe alcohol use disorder (HCC)     Bleeding esophageal varices (H

## 2018-02-01 NOTE — PROGRESS NOTES
[facility] dosing service    Initial Pharmacokinetic Consult for Vancomycin Dosing     Surinder Gomez is a 30year old female admitted on 01/30/18 who is being treated for pneumonia. Pharmacy has been asked to dose Vancomycin by GISEL Cook.     Carmen Rogers 3:00 PM   Result Value Ref Range   Blood Culture Result No Growth 1 Day N/A   2. URINE CULTURE, ROUTINE Status: None   Collection Time: 01/30/18 12:53 PM   Result Value Ref Range   Urine Culture No Growth at 18-24 hrs.  N/A       Radiology:    (1/31/18) Braulio Matamoros

## 2018-02-01 NOTE — DIETARY NOTE
NUTRITION INITIAL ASSESSMENT    Pt is at moderate nutrition risk. Pt does not meet malnutrition criteria.     NUTRITION DIAGNOSIS/PROBLEM:    Inadequate oral intake related to inability to consume sufficient energy as evidenced by decreased appetite/po [facility]. calories per kg)  Protein:  47 grams protein/day (.8 grams protein per kg)  Fluid: ~1 ml/kcal or per MD discretion    MONITOR AND EVALUATE/NUTRITION GOALS:    1. PO intake to meet at least 75% patient nutrition prescription  2.  At least 75% intake of oral

## 2018-02-01 NOTE — PLAN OF CARE
Pt drowsy, weak and fatigued, slept frequently throughout shift. Abdominal ultrasound negative for ascites. Chest xray indicated bilateral pleural effusions, breathing is shallow, lungs sounds diminished upon ausculation. Pt remains on 3L NC sats 95%.  Pt h

## 2018-02-01 NOTE — PROGRESS NOTES
[facility]  Progress Note    Del Bloodgood Patient Status:  Inpatient    3/19/1980 MRN [id number]   [facility] 4SW-A Attending Will Cruz MD   [address] Day # 1 PCP Emily Sow MD     Subjective:  Del Bloodgood is a(n) Coagulopathy ([facility] [address]     Acute blood loss anemia     Hyponatremia     Alcoholic cirrhosis of liver with ascites (HCC)     Hyperbilirubinemia     Anxiety disorder     Depressive disorder     Severe alcohol use disorder (HCC)     Bleeding esophageal varices (H

## 2018-02-01 NOTE — PROGRESS NOTES
Hgb 6.7  One unit PRBC ordered to transfuse per [facility] plan    GISEL Dos Santos  Critical Care NP  Al 227: [phone]  Pgr: [phone]

## 2018-02-01 NOTE — PROGRESS NOTES
Pulmonary Progress Note        NAME: Hyattyesika Gibbs - ROOM: 582/134-I - MRN: [id number] - Age: 30year old - : 3/19/1980        SUBJECTIVE: No events overnight, would like her haynes out    OBJECTIVE:   18  0600 18  0700 18  0800  Recent Labs   01/30/18  1243 01/30/18  2319 01/31/18  0447 02/01/18  0432   * 135* 136 136   K 3.3* 3.5* 3.5* 3.9   CL 95* 99* 99* 101   CO2 28.0 28.0 27.0 22.0   BUN 27* 28* 29* 37*   CA 8.3 7.9* 8.1* 8.1*   MG  --   --   --  2.0   PHOS  --

## 2018-02-01 NOTE — PLAN OF CARE
GASTROINTESTINAL - ADULT    • Maintains or returns to baseline bowel function Not Progressing          NEUROLOGICAL - ADULT    • Achieves stable or improved neurological status Not Progressing          HEMATOLOGIC - ADULT    • Maintains hematologic stabili

## 2018-02-01 NOTE — CM/SW NOTE
SW received an order for advanced directives. SW met w/pt regarding this. SW explained what a HCPOA is. Pt seemed to understand. Pt stated, \"I don't need one right now. \" SW explained surrogate act. SW left HCPOA paperwork for the pt to review.  Pt to cont

## 2018-02-02 NOTE — PAYOR COMM NOTE
--------------  CONTINUED STAY REVIEW    Payor: Jimmy Rinaldi #:  [id number]  Authorization Number: [id number]    Admit date: 1/30/18  Admit time: 2122    Admitting Physician: Karyn Simon MD  Attending y and Critical Care    Assessment/Plan:  Hospitalist  GI with plans for transfer to [facility] once bed available  Recent C diff, once again +C diff  - PO vancomycin  -was started on proph on admit  Once bed available at [facility], ok for d/c from IM standpoint  Mayra Lorenzo Josie Phillips RN      Piperacillin Sod-Tazobactam So (ZOSYN) 3.375 g in dextrose 5 % 100 mL ADD-vantage     Date Action Dose Route User    2/2/2018 0600 New Bag 3.375 g Intravenous Roberta Samaniego RN    2/1/2018 2216 New Bag 3.375 g Intravenous Saisraelimbran, [employer]

## 2018-02-02 NOTE — PLAN OF CARE
HEMATOLOGIC - ADULT    • Maintains hematologic stability Progressing    • Free from bleeding injury Progressing          NEUROLOGICAL - ADULT    • Achieves stable or improved neurological status Not Progressing                Assumed pt care at 0730.   Drow

## 2018-02-02 NOTE — PROGRESS NOTES
[facility] Hospitalist Progress Note     PCP: Evette Storey MD    CC:  Follow up    SUBJECTIVE:  Pt jaundiced and lethargic- awakes to voice but goes back to sleep    OBJECTIVE:  Temp:  [97.4 °F (36.3 °C)-97.8 °F (36.6 °C)] 97.8 °F (36.6 °C)  Pulse:  [32-62 01/31/18   0447  02/01/18   0432   ALT  21   --   24  19   AST  62*   --   62*  49*   ALB  2.4*   --   2.2*  2.9*   LDH  350*  348*   --    --             Meds:     • [START ON 2/3/2018] vancomycin  15 mg/kg (Adjusted) Intravenous Q48H   • calcium acetate Mone Barboza MD    [facility] Hospitalist  Pager: [phone]

## 2018-02-02 NOTE — PROGRESS NOTES
[facility] Pharmacy Note:  Renal Adjustment for vancomycin (VANCOCIN)    Johan Morton is a 30year old female who has been prescribed vancomycin (VANCOCIN) 1 g every 24 hrs.   CrCl is estimated creatinine clearance is 18.2 mL/min (based on SCr of 4.03 mg/dL (H)

## 2018-02-02 NOTE — PROGRESS NOTES
02/02/18 1732   Clinical Encounter Type   Visited With Health care provider   Referral for follow up from [facility]. Attempted visit pt. was sleeping.  Consulted with pt.'s RN who advised that pt. was shortly to be transferred to [facility].      02/02/18 1

## 2018-02-02 NOTE — CM/SW NOTE
Received a call from RN stating that patient is awaiting a bed at [facility]. CM called file room for imagings. Chart copied and is on front of hard chart. Per Awilda Pointer will set up transport when bed available.  / to [employer] [facility]

## 2018-02-02 NOTE — PROGRESS NOTES
NURSING TRANSFER NOTE      Patient admitted via [address] to room. Safety precautions initiated. Bed in low position. Call light in reach. No c/o pain. Updated with plan of care and verbalized understanding.

## 2018-02-02 NOTE — PROGRESS NOTES
02/02/18 1114   Clinical Encounter Type   Visited With Patient and family together;Health care provider  (dad was present by the bedside)    responded to the page/consult for SAMMY GARRIDO [facility]; however, the patient was tired/sleepy.  Patient's dad tried to w

## 2018-02-02 NOTE — PROGRESS NOTES
[facility]  Progress Note    Levar Dewitt Patient Status:  Inpatient    3/19/1980 MRN [id number]   [facility] 4SW-A Attending Chad Ibarra MD   [facility] Day # 3 PCP Reed Romo MD     Subjective:  Levar Dewitt is a(n) unspecified type     Ascites due to alcoholic cirrhosis (HCC)     Nosocomial pneumonia     Chronic liver failure without hepatic coma (HCC)     Jaundice     Acute renal failure (ARF) (HCC)     Acute kidney injury ([facility] [address]     Community acquired pneumonia of l

## 2018-02-02 NOTE — PROGRESS NOTES
[address]  Nephrology Progress Note    Kirk Shetty Attending: Lamar Chawla MD       SUBJECTIVE:     Looks worse today. Lethargic. No documented fever.  Breathing is stable  Cr up   BPs stable     PHYSICAL EXAM:     Vital Signs: /45 02/01/2018   NE 7.24 (H) 02/01/2018   LYMABS 1.10 02/01/2018   MOABSO 0.80 (H) 02/01/2018   EOABSO 0.14 02/01/2018   BAABSO 0.05 02/01/2018       Lab Results  Component Value Date   BARAK 217.00 02/01/2018       Lab Results  Component Value Date   Elena Rodríguez recent hospitalization for liver decompensation with C dif who presents with worsening of liver function.      OLIVIER - HRS type I. Creatinine continues to uptrend in the setting of near anuria, no response to HRS cocktail with albumin, midodrine, octreotide.

## 2018-02-02 NOTE — PROGRESS NOTES
[facility] hospitalist daily Note    Patient was seen/examined on 2/1/8    S: no chest pain, no SOB, no fever    Medications in EPIC    PE:   02/01/18  1810   BP: 101/52   Pulse:    Resp: 16   Temp: 97.6 °F (36.4 °C)     Gen: awake, alert, no respiratory distress

## 2018-02-02 NOTE — PROGRESS NOTES
Pulmonary Progress Note        NAME: Mavis Ness - ROOM: 7426/4365-L - MRN: [id number] - Age: 30year old - : 3/19/1980        SUBJECTIVE: No events overnight, more lethargic today, feels her lungs hurt    OBJECTIVE:   18  0014 18  0020 0 01/31/18  0447 02/01/18  0432   * 135* 136 136   K 3.3* 3.5* 3.5* 3.9   CL 95* 99* 99* 101   CO2 28.0 28.0 27.0 22.0   BUN 27* 28* 29* 37*   CA 8.3 7.9* 8.1* 8.1*   MG  --   --   --  2.0   PHOS  --   --   --  6.1*         Recent Labs   01/30/18  124

## 2018-02-03 NOTE — PLAN OF CARE
Bed available at [facility]. Report given. I called patients mom and notified of transfer.  time 1800, delayed now until 1900.   1830-Dr. Haynes on unit, updated. 1900-transport here to take patient.
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
